# Patient Record
Sex: MALE | Race: WHITE | NOT HISPANIC OR LATINO | URBAN - METROPOLITAN AREA
[De-identification: names, ages, dates, MRNs, and addresses within clinical notes are randomized per-mention and may not be internally consistent; named-entity substitution may affect disease eponyms.]

---

## 2018-11-29 ENCOUNTER — EMERGENCY (EMERGENCY)
Facility: HOSPITAL | Age: 63
LOS: 1 days | Discharge: ANOTHER HOSPITAL | End: 2018-11-29
Attending: EMERGENCY MEDICINE | Admitting: EMERGENCY MEDICINE
Payer: COMMERCIAL

## 2018-11-29 ENCOUNTER — INPATIENT (INPATIENT)
Facility: HOSPITAL | Age: 63
LOS: 7 days | Discharge: HOME HEALTH | DRG: 234 | End: 2018-12-07
Attending: THORACIC SURGERY (CARDIOTHORACIC VASCULAR SURGERY) | Admitting: INTERNAL MEDICINE
Payer: COMMERCIAL

## 2018-11-29 VITALS
HEART RATE: 62 BPM | WEIGHT: 199.96 LBS | OXYGEN SATURATION: 99 % | RESPIRATION RATE: 16 BRPM | SYSTOLIC BLOOD PRESSURE: 160 MMHG | TEMPERATURE: 98 F | HEIGHT: 69 IN | DIASTOLIC BLOOD PRESSURE: 77 MMHG

## 2018-11-29 VITALS
HEART RATE: 65 BPM | OXYGEN SATURATION: 100 % | HEIGHT: 69 IN | RESPIRATION RATE: 17 BRPM | DIASTOLIC BLOOD PRESSURE: 94 MMHG | TEMPERATURE: 98 F | SYSTOLIC BLOOD PRESSURE: 180 MMHG | WEIGHT: 199.96 LBS

## 2018-11-29 VITALS
OXYGEN SATURATION: 98 % | SYSTOLIC BLOOD PRESSURE: 151 MMHG | TEMPERATURE: 98 F | RESPIRATION RATE: 16 BRPM | HEART RATE: 58 BPM | DIASTOLIC BLOOD PRESSURE: 69 MMHG

## 2018-11-29 DIAGNOSIS — I21.4 NON-ST ELEVATION (NSTEMI) MYOCARDIAL INFARCTION: ICD-10-CM

## 2018-11-29 DIAGNOSIS — R07.89 OTHER CHEST PAIN: ICD-10-CM

## 2018-11-29 DIAGNOSIS — Z95.5 PRESENCE OF CORONARY ANGIOPLASTY IMPLANT AND GRAFT: Chronic | ICD-10-CM

## 2018-11-29 DIAGNOSIS — I10 ESSENTIAL (PRIMARY) HYPERTENSION: ICD-10-CM

## 2018-11-29 DIAGNOSIS — I25.119 ATHEROSCLEROTIC HEART DISEASE OF NATIVE CORONARY ARTERY WITH UNSPECIFIED ANGINA PECTORIS: ICD-10-CM

## 2018-11-29 PROBLEM — Z00.00 ENCOUNTER FOR PREVENTIVE HEALTH EXAMINATION: Status: ACTIVE | Noted: 2018-11-29

## 2018-11-29 LAB
ALBUMIN SERPL ELPH-MCNC: 3.7 G/DL — SIGNIFICANT CHANGE UP (ref 3.3–5)
ALP SERPL-CCNC: 166 U/L — HIGH (ref 40–120)
ALT FLD-CCNC: 61 U/L — SIGNIFICANT CHANGE UP (ref 12–78)
ANION GAP SERPL CALC-SCNC: 9 MMOL/L — SIGNIFICANT CHANGE UP (ref 5–17)
APPEARANCE UR: CLEAR — SIGNIFICANT CHANGE UP
APTT BLD: 33 SEC — SIGNIFICANT CHANGE UP (ref 27.5–36.3)
APTT BLD: 48.6 SEC — HIGH (ref 27.5–36.3)
AST SERPL-CCNC: 28 U/L — SIGNIFICANT CHANGE UP (ref 15–37)
BASOPHILS # BLD AUTO: 0.03 K/UL — SIGNIFICANT CHANGE UP (ref 0–0.2)
BASOPHILS NFR BLD AUTO: 0.4 % — SIGNIFICANT CHANGE UP (ref 0–2)
BILIRUB SERPL-MCNC: 1.3 MG/DL — HIGH (ref 0.2–1.2)
BILIRUB UR-MCNC: NEGATIVE — SIGNIFICANT CHANGE UP
BLD GP AB SCN SERPL QL: NEGATIVE — SIGNIFICANT CHANGE UP
BUN SERPL-MCNC: 24 MG/DL — HIGH (ref 7–23)
CALCIUM SERPL-MCNC: 8.6 MG/DL — SIGNIFICANT CHANGE UP (ref 8.5–10.1)
CHLORIDE SERPL-SCNC: 108 MMOL/L — SIGNIFICANT CHANGE UP (ref 96–108)
CHOLEST SERPL-MCNC: 170 MG/DL — SIGNIFICANT CHANGE UP (ref 10–199)
CK MB BLD-MCNC: 1.7 % — SIGNIFICANT CHANGE UP (ref 0–3.5)
CK MB CFR SERPL CALC: 1.9 NG/ML — SIGNIFICANT CHANGE UP (ref 0–3.6)
CK SERPL-CCNC: 115 U/L — SIGNIFICANT CHANGE UP (ref 26–308)
CO2 SERPL-SCNC: 25 MMOL/L — SIGNIFICANT CHANGE UP (ref 22–31)
COLOR SPEC: SIGNIFICANT CHANGE UP
CREAT SERPL-MCNC: 1 MG/DL — SIGNIFICANT CHANGE UP (ref 0.5–1.3)
DIFF PNL FLD: NEGATIVE — SIGNIFICANT CHANGE UP
EOSINOPHIL # BLD AUTO: 0.17 K/UL — SIGNIFICANT CHANGE UP (ref 0–0.5)
EOSINOPHIL NFR BLD AUTO: 2 % — SIGNIFICANT CHANGE UP (ref 0–6)
GLUCOSE SERPL-MCNC: 114 MG/DL — HIGH (ref 70–99)
GLUCOSE UR QL: NEGATIVE — SIGNIFICANT CHANGE UP
HBA1C BLD-MCNC: 5.7 % — HIGH (ref 4–5.6)
HCT VFR BLD CALC: 42.7 % — SIGNIFICANT CHANGE UP (ref 39–50)
HGB BLD-MCNC: 14.4 G/DL — SIGNIFICANT CHANGE UP (ref 13–17)
IMM GRANULOCYTES NFR BLD AUTO: 0.2 % — SIGNIFICANT CHANGE UP (ref 0–1.5)
INR BLD: 1.08 RATIO — SIGNIFICANT CHANGE UP (ref 0.88–1.16)
KETONES UR-MCNC: SIGNIFICANT CHANGE UP
LEUKOCYTE ESTERASE UR-ACNC: NEGATIVE — SIGNIFICANT CHANGE UP
LYMPHOCYTES # BLD AUTO: 1.6 K/UL — SIGNIFICANT CHANGE UP (ref 1–3.3)
LYMPHOCYTES # BLD AUTO: 19 % — SIGNIFICANT CHANGE UP (ref 13–44)
MCHC RBC-ENTMCNC: 30.6 PG — SIGNIFICANT CHANGE UP (ref 27–34)
MCHC RBC-ENTMCNC: 33.7 GM/DL — SIGNIFICANT CHANGE UP (ref 32–36)
MCV RBC AUTO: 90.9 FL — SIGNIFICANT CHANGE UP (ref 80–100)
MONOCYTES # BLD AUTO: 0.64 K/UL — SIGNIFICANT CHANGE UP (ref 0–0.9)
MONOCYTES NFR BLD AUTO: 7.6 % — SIGNIFICANT CHANGE UP (ref 2–14)
NEUTROPHILS # BLD AUTO: 5.96 K/UL — SIGNIFICANT CHANGE UP (ref 1.8–7.4)
NEUTROPHILS NFR BLD AUTO: 70.8 % — SIGNIFICANT CHANGE UP (ref 43–77)
NITRITE UR-MCNC: NEGATIVE — SIGNIFICANT CHANGE UP
NRBC # BLD: 0 /100 WBCS — SIGNIFICANT CHANGE UP (ref 0–0)
NT-PROBNP SERPL-SCNC: 74 PG/ML — SIGNIFICANT CHANGE UP (ref 0–300)
PH UR: 7.5 — SIGNIFICANT CHANGE UP (ref 5–8)
PLATELET # BLD AUTO: 159 K/UL — SIGNIFICANT CHANGE UP (ref 150–400)
POTASSIUM SERPL-MCNC: 4.2 MMOL/L — SIGNIFICANT CHANGE UP (ref 3.5–5.3)
POTASSIUM SERPL-SCNC: 4.2 MMOL/L — SIGNIFICANT CHANGE UP (ref 3.5–5.3)
PROT SERPL-MCNC: 8.1 G/DL — SIGNIFICANT CHANGE UP (ref 6–8.3)
PROT UR-MCNC: NEGATIVE — SIGNIFICANT CHANGE UP
PROTHROM AB SERPL-ACNC: 12.3 SEC — SIGNIFICANT CHANGE UP (ref 10–12.9)
RBC # BLD: 4.7 M/UL — SIGNIFICANT CHANGE UP (ref 4.2–5.8)
RBC # FLD: 12.6 % — SIGNIFICANT CHANGE UP (ref 10.3–14.5)
RH IG SCN BLD-IMP: POSITIVE — SIGNIFICANT CHANGE UP
SODIUM SERPL-SCNC: 142 MMOL/L — SIGNIFICANT CHANGE UP (ref 135–145)
SP GR SPEC: 1.02 — SIGNIFICANT CHANGE UP (ref 1.01–1.02)
T3 SERPL-MCNC: 120 NG/DL — SIGNIFICANT CHANGE UP (ref 80–200)
T4 AB SER-ACNC: 6.6 UG/DL — SIGNIFICANT CHANGE UP (ref 4.6–12)
TROPONIN I SERPL-MCNC: 0.01 NG/ML — SIGNIFICANT CHANGE UP (ref 0.01–0.04)
TROPONIN I SERPL-MCNC: 0.18 NG/ML — HIGH (ref 0.01–0.04)
TSH SERPL-MCNC: 1.4 UIU/ML — SIGNIFICANT CHANGE UP (ref 0.27–4.2)
UROBILINOGEN FLD QL: NEGATIVE — SIGNIFICANT CHANGE UP
WBC # BLD: 8.42 K/UL — SIGNIFICANT CHANGE UP (ref 3.8–10.5)
WBC # FLD AUTO: 8.42 K/UL — SIGNIFICANT CHANGE UP (ref 3.8–10.5)

## 2018-11-29 PROCEDURE — 93005 ELECTROCARDIOGRAM TRACING: CPT

## 2018-11-29 PROCEDURE — 80053 COMPREHEN METABOLIC PANEL: CPT

## 2018-11-29 PROCEDURE — 71045 X-RAY EXAM CHEST 1 VIEW: CPT | Mod: 26

## 2018-11-29 PROCEDURE — 71045 X-RAY EXAM CHEST 1 VIEW: CPT | Mod: 26,77

## 2018-11-29 PROCEDURE — 71045 X-RAY EXAM CHEST 1 VIEW: CPT

## 2018-11-29 PROCEDURE — 82553 CREATINE MB FRACTION: CPT

## 2018-11-29 PROCEDURE — 82550 ASSAY OF CK (CPK): CPT

## 2018-11-29 PROCEDURE — 84484 ASSAY OF TROPONIN QUANT: CPT

## 2018-11-29 PROCEDURE — 85730 THROMBOPLASTIN TIME PARTIAL: CPT

## 2018-11-29 PROCEDURE — 99285 EMERGENCY DEPT VISIT HI MDM: CPT | Mod: 25

## 2018-11-29 PROCEDURE — 93306 TTE W/DOPPLER COMPLETE: CPT | Mod: 26

## 2018-11-29 PROCEDURE — 99152 MOD SED SAME PHYS/QHP 5/>YRS: CPT | Mod: GC

## 2018-11-29 PROCEDURE — 93458 L HRT ARTERY/VENTRICLE ANGIO: CPT | Mod: 26,GC

## 2018-11-29 PROCEDURE — 85027 COMPLETE CBC AUTOMATED: CPT

## 2018-11-29 PROCEDURE — 36415 COLL VENOUS BLD VENIPUNCTURE: CPT

## 2018-11-29 PROCEDURE — 93010 ELECTROCARDIOGRAM REPORT: CPT

## 2018-11-29 PROCEDURE — 99285 EMERGENCY DEPT VISIT HI MDM: CPT

## 2018-11-29 RX ORDER — METOPROLOL TARTRATE 50 MG
0 TABLET ORAL
Qty: 0 | Refills: 0 | COMMUNITY

## 2018-11-29 RX ORDER — ATORVASTATIN CALCIUM 80 MG/1
80 TABLET, FILM COATED ORAL AT BEDTIME
Qty: 0 | Refills: 0 | Status: DISCONTINUED | OUTPATIENT
Start: 2018-11-29 | End: 2018-12-03

## 2018-11-29 RX ORDER — ASPIRIN/CALCIUM CARB/MAGNESIUM 324 MG
81 TABLET ORAL DAILY
Qty: 0 | Refills: 0 | Status: DISCONTINUED | OUTPATIENT
Start: 2018-11-29 | End: 2018-12-03

## 2018-11-29 RX ORDER — HEPARIN SODIUM 5000 [USP'U]/ML
INJECTION INTRAVENOUS; SUBCUTANEOUS
Qty: 25000 | Refills: 0 | Status: DISCONTINUED | OUTPATIENT
Start: 2018-11-29 | End: 2018-12-03

## 2018-11-29 RX ORDER — CLOPIDOGREL BISULFATE 75 MG/1
0 TABLET, FILM COATED ORAL
Qty: 0 | Refills: 0 | COMMUNITY

## 2018-11-29 RX ORDER — ATORVASTATIN CALCIUM 80 MG/1
0 TABLET, FILM COATED ORAL
Qty: 0 | Refills: 0 | COMMUNITY

## 2018-11-29 RX ORDER — METOPROLOL TARTRATE 50 MG
100 TABLET ORAL DAILY
Qty: 0 | Refills: 0 | Status: DISCONTINUED | OUTPATIENT
Start: 2018-11-29 | End: 2018-12-03

## 2018-11-29 RX ORDER — ASPIRIN/CALCIUM CARB/MAGNESIUM 324 MG
325 TABLET ORAL ONCE
Qty: 0 | Refills: 0 | Status: COMPLETED | OUTPATIENT
Start: 2018-11-29 | End: 2018-11-29

## 2018-11-29 RX ORDER — TICAGRELOR 90 MG/1
180 TABLET ORAL ONCE
Qty: 0 | Refills: 0 | Status: COMPLETED | OUTPATIENT
Start: 2018-11-29 | End: 2018-11-29

## 2018-11-29 RX ORDER — VALSARTAN 80 MG/1
0 TABLET ORAL
Qty: 0 | Refills: 0 | COMMUNITY

## 2018-11-29 RX ORDER — METOPROLOL TARTRATE 50 MG
25 TABLET ORAL ONCE
Qty: 0 | Refills: 0 | Status: COMPLETED | OUTPATIENT
Start: 2018-11-29 | End: 2018-11-29

## 2018-11-29 RX ORDER — HEPARIN SODIUM 5000 [USP'U]/ML
5600 INJECTION INTRAVENOUS; SUBCUTANEOUS EVERY 6 HOURS
Qty: 0 | Refills: 0 | Status: DISCONTINUED | OUTPATIENT
Start: 2018-11-29 | End: 2018-12-03

## 2018-11-29 RX ORDER — AMLODIPINE BESYLATE 2.5 MG/1
5 TABLET ORAL DAILY
Qty: 0 | Refills: 0 | Status: DISCONTINUED | OUTPATIENT
Start: 2018-11-29 | End: 2018-12-03

## 2018-11-29 RX ADMIN — ATORVASTATIN CALCIUM 80 MILLIGRAM(S): 80 TABLET, FILM COATED ORAL at 22:14

## 2018-11-29 RX ADMIN — Medication 325 MILLIGRAM(S): at 07:55

## 2018-11-29 RX ADMIN — AMLODIPINE BESYLATE 5 MILLIGRAM(S): 2.5 TABLET ORAL at 17:42

## 2018-11-29 RX ADMIN — HEPARIN SODIUM 1000 UNIT(S)/HR: 5000 INJECTION INTRAVENOUS; SUBCUTANEOUS at 23:04

## 2018-11-29 RX ADMIN — Medication 25 MILLIGRAM(S): at 13:22

## 2018-11-29 RX ADMIN — TICAGRELOR 180 MILLIGRAM(S): 90 TABLET ORAL at 13:22

## 2018-11-29 NOTE — CONSULT NOTE ADULT - PROBLEM SELECTOR RECOMMENDATION 3
patient s/p 3 stents about 10 years ago. Continue Aspirin, Plavix, statin and ARB. Patient should come for nuclear stress test tomorrow in our office to evaluate for ischemia.

## 2018-11-29 NOTE — ED PROVIDER NOTE - PROGRESS NOTE DETAILS
All results were explained to patient and/or family and a copy of all available results given.  As per Saundra, transfer to [place]

## 2018-11-29 NOTE — ED PROVIDER NOTE - CARE PLAN
Principal Discharge DX:	Chest pain Principal Discharge DX:	NSTEMI (non-ST elevated myocardial infarction)

## 2018-11-29 NOTE — H&P CARDIOLOGY - PMH
CAD (coronary artery disease)    Gout    Hypercholesteremia    Hypertension    NSTEMI (non-ST elevated myocardial infarction)

## 2018-11-29 NOTE — ED PROVIDER NOTE - OBJECTIVE STATEMENT
chest tightness since 6am today chest tightness since 6am today, lasted about 30 minutes, resolved in the ED.  similar to prior chest pain resulting in cardiac stents x 3.

## 2018-11-29 NOTE — ED ADULT NURSE NOTE - OBJECTIVE STATEMENT
Patient BIBEMS for Patient BIBEMS for chest tightness and pressure that began at 6am today. Patient has hx of stents 2/2 occlusions. Patient denies chest pain/pressure/tightness at this time. VSS. Monitoring patient closely on tele.

## 2018-11-29 NOTE — CONSULT NOTE ADULT - PROBLEM SELECTOR RECOMMENDATION 9
Patient with history of CAD. First troponin negative and ECG shows no ischemic changes. I would recommend another troponin at noon. If negative patient can be discharged home. Our office was already called and patient going to be set up for a nuclear stress test tomorrow. I explained to patient and wife that if chest pressure returns when he goes home to call 911 immediately and return to hospital

## 2018-11-29 NOTE — H&P CARDIOLOGY - HISTORY OF PRESENT ILLNESS
This is a 63 year old  male with history of hypertension, HLD, gout  and CAD s/p 3 stents about 10 years prior ( on Aspirin and Plavix);  significant FH for premature CAD, father  at 61 from MI.   Presents to [facility] ED this am with c/c of chest pain. The pain was described as a mild-moderate pressure like sensation in the center of chest and non-radiating, constant. Patient states he woke up around 6 am with the pain. Pain was non-radiating. Not associated with palpitations or SOB. Patient states he called 911 and EMS brought him to hospital. Patient states he was not given SL nitro. Pain subsided in about 20-30 minutes w/o intervention.  Patient states he is compliant with his medications. He played soccer last week with no chest pain, SOB, or palpitations but has experienced CP a few times before, cardiologist Dr Levin, pt reports last NST was normal approx 2 years ago. in ED First troponin negative but 2nd elevated at 0.181; ECG shows no ischemic changes.  Transferred to [facility] for cardiac cath with possible intervention.  Presently asymptomatic in no chest pain. This is a 63 year old  male with history of hypertension, HLD, gout  and CAD s/p 3 stents about 10 years prior ( on Aspirin and Plavix);  significant FH for premature CAD, father  at 61 from MI.   Presents to [facility] ED this am with c/c of chest pain. The pain was described as a mild-moderate pressure like sensation in the center of chest and non-radiating, constant. Patient states he woke up around 6 am with the pain. Pain was non-radiating. Not associated with palpitations or SOB. Patient states he called 911 and EMS brought him to hospital. Patient states he was not given SL nitro. Pain subsided in about 20-30 minutes after taking " 2 baby aspirins".  Patient states he is compliant with his medications. He played soccer last week with no chest pain, SOB, or palpitations but has experienced CP a few times before, cardiologist Dr Levin, pt reports last NST was normal approx 2 years ago. in ED First troponin negative but 2nd elevated at 0.181; ECG shows no ischemic changes.  Transferred to [facility] for cardiac cath with possible intervention.  Presently asymptomatic in no chest pain.

## 2018-11-29 NOTE — ED ADULT NURSE NOTE - CHPI ED NUR SYMPTOMS NEG
no congestion/no dizziness/no shortness of breath/no vomiting/no diaphoresis/no fever/no nausea/no back pain/no chills/no syncope

## 2018-11-29 NOTE — CONSULT NOTE ADULT - SUBJECTIVE AND OBJECTIVE BOX
REQUESTING PHYSICIAN: Dr. Buckley    REASON FOR CONSULT: Patient is a 63y old  Male who presents with a chief complaint of chest pain    CHIEF COMPLAINT: Patient is a 63y old  Male who presents with a chief complaint of chest pain.    HPI: 63 year old male with history of hypertension and CAD s/p 3 stents about 10 years prior presents to the ED with chest pain. The pain was described as a mild-moderate pressure like sensation in the center of chest and non-radiating. Patient states he woke up around 6 am with the pain. Pain was non-radiating. Not associated with palpitations or SOB. Patient states he called 911 and EMS brought him to hospital. Patient states he was not given SL nitro. Pain subsided in about 20-30 minutes. Patient states he is compliant with his medications. He played soccer last week with no chest pain, SOB, or palpitations.       PAST MEDICAL & SURGICAL HISTORY:  Gout  Hypercholesteremia  Hypertension  Coronary stent patent  Coronary Artery Disease        FAMILY HISTORY: non-contributory      MEDICATIONS  (STANDING):    MEDICATIONS  (PRN):      Allergies    No Known Allergies    Intolerances        REVIEW OF SYSTEMS:    CONSTITUTIONAL: No weakness, fevers or chills  EYES/ENT: No visual changes;  No vertigo or throat pain   NECK: No pain or stiffness  RESPIRATORY: No cough, wheezing, hemoptysis; No shortness of breath  CARDIOVASCULAR: (+) chest pain, No palpitations  GASTROINTESTINAL: No abdominal pain. No nausea, vomiting, or hematemesis; No diarrhea or constipation. No melena or hematochezia.  GENITOURINARY: No dysuria, frequency or hematuria  NEUROLOGICAL: No numbness or weakness  SKIN: No itching or rash  All other review of systems is negative unless indicated above    VITAL SIGNS:   Vital Signs Last 24 Hrs  T(C): 37 (2018 07:59), Max: 37 (2018 07:59)  T(F): 98.6 (2018 07:59), Max: 98.6 (2018 07:59)  HR: 66 (2018 07:59) (65 - 66)  BP: 167/77 (2018 07:59) (167/77 - 180/94)  BP(mean): --  RR: 18 (2018 07:59) (17 - 18)  SpO2: 94% (2018 07:59) (94% - 100%)    I&O's Summary      PHYSICAL EXAM:    Constitutional: NAD, awake and alert, well-developed  Eyes:  EOMI,  Pupils round, No oral cyanosis.  Pulmonary: Non-labored, breath sounds are clear bilaterally, No wheezing, rales or rhonchi  Cardiovascular: S1 and S2, regular rate and rhythm, no Murmurs, gallops or rubs  Gastrointestinal: Bowel Sounds present, soft, nontender.   Lymph: No peripheral edema. No cervical lymphadenopathy.  Neurological: Alert, no focal deficits  Extremities: no lower extremity edema bilaterally. intact distal pedal pulses bilaterally.  Skin: No rashes.  Psych:  Mood & affect appropriate    LABS: All Labs Reviewed:                        14.4   8.42  )-----------( 159      ( 2018 07:54 )             42.7     2018 07:54    142    |  108    |  24     ----------------------------<  114    4.2     |  25     |  1.00     Ca    8.6        2018 07:54    TPro  8.1    /  Alb  3.7    /  TBili  1.3    /  DBili  x      /  AST  28     /  ALT  61     /  AlkPhos  166    2018 07:54    PTT - ( 2018 07:54 )  PTT:33.0 sec  CARDIAC MARKERS ( 2018 07:54 )  .015 ng/mL / x     / 115 U/L / x     / 1.9 ng/mL      Blood Culture:         EK2018, NSR with possible LAE    Imaging:  < from: Xray Chest 1 View AP/PA (18 @ 07:46) >    EXAM:  XR CHEST AP OR PA 1V                            PROCEDURE DATE:  2018          INTERPRETATION:  Chest pain.    AP chest.    Heart not grossly enlarged. Mild nonspecific accentuated bibasilar   bronchovascular markings likely chronic. No focal consolidation or   pleural effusion.    Impression: No acute pleural-parenchymal process.                ANN-MARIE MCKEON M.D., ATTENDING RADIOLOGIST  This document has been electronically signed. 2018  8:49AM    < end of copied text > REQUESTING PHYSICIAN: Dr. Buckley    REASON FOR CONSULT: Patient is a 63y old  Male who presents with a chief complaint of chest pain    CHIEF COMPLAINT: Patient is a 63y old  Male who presents with a chief complaint of chest pain.    HPI: 63 year old male with history of hypertension and CAD s/p 3 stents about 10 years prior presents to the ED with chest pain. The pain was described as a mild-moderate pressure like sensation in the center of chest and non-radiating. Patient states he woke up around 6 am with the pain. Pain was non-radiating. Not associated with palpitations or SOB. Patient states he called 911 and EMS brought him to hospital. Patient states he was not given SL nitro. Pain subsided in about 20-30 minutes. Patient states he is compliant with his medications. He played soccer last week with no chest pain, SOB, or palpitations.       PAST MEDICAL & SURGICAL HISTORY:  Gout  Hypercholesteremia  Hypertension  Coronary stent patent  Coronary Artery Disease    SOCIAL HISTORY:  no smoking, social EtOH, no illicit drug use, lives at home with wife.      FAMILY HISTORY: non-contributory      MEDICATIONS  (STANDING):    MEDICATIONS  (PRN):      Allergies    No Known Allergies    Intolerances        REVIEW OF SYSTEMS:    CONSTITUTIONAL: No weakness, fevers or chills  EYES/ENT: No visual changes;  No vertigo or throat pain   NECK: No pain or stiffness  RESPIRATORY: No cough, wheezing, hemoptysis; No shortness of breath  CARDIOVASCULAR: (+) chest pain, No palpitations  GASTROINTESTINAL: No abdominal pain. No nausea, vomiting, or hematemesis; No diarrhea or constipation. No melena or hematochezia.  GENITOURINARY: No dysuria, frequency or hematuria  NEUROLOGICAL: No numbness or weakness  SKIN: No itching or rash  All other review of systems is negative unless indicated above    VITAL SIGNS:   Vital Signs Last 24 Hrs  T(C): 37 (2018 07:59), Max: 37 (2018 07:59)  T(F): 98.6 (2018 07:59), Max: 98.6 (2018 07:59)  HR: 66 (2018 07:59) (65 - 66)  BP: 167/77 (2018 07:59) (167/77 - 180/94)  BP(mean): --  RR: 18 (2018 07:59) (17 - 18)  SpO2: 94% (2018 07:59) (94% - 100%)    I&O's Summary      PHYSICAL EXAM:    Constitutional: NAD, awake and alert, well-developed  Eyes:  EOMI,  Pupils round, No oral cyanosis.  Pulmonary: Non-labored, breath sounds are clear bilaterally, No wheezing, rales or rhonchi  Cardiovascular: S1 and S2, regular rate and rhythm, no Murmurs, gallops or rubs  Gastrointestinal: Bowel Sounds present, soft, nontender.   Lymph: No peripheral edema. No cervical lymphadenopathy.  Neurological: Alert, no focal deficits  Extremities: no lower extremity edema bilaterally. intact distal pedal pulses bilaterally.  Skin: No rashes.  Psych:  Mood & affect appropriate    LABS: All Labs Reviewed:                        14.4   8.42  )-----------( 159      ( 2018 07:54 )             42.7     2018 07:54    142    |  108    |  24     ----------------------------<  114    4.2     |  25     |  1.00     Ca    8.6        2018 07:54    TPro  8.1    /  Alb  3.7    /  TBili  1.3    /  DBili  x      /  AST  28     /  ALT  61     /  AlkPhos  166    2018 07:54    PTT - ( 2018 07:54 )  PTT:33.0 sec  CARDIAC MARKERS ( 2018 07:54 )  .015 ng/mL / x     / 115 U/L / x     / 1.9 ng/mL      Blood Culture:         EK2018, NSR with possible LAE    Imaging:  < from: Xray Chest 1 View AP/PA (18 @ 07:46) >    EXAM:  XR CHEST AP OR PA 1V                            PROCEDURE DATE:  2018          INTERPRETATION:  Chest pain.    AP chest.    Heart not grossly enlarged. Mild nonspecific accentuated bibasilar   bronchovascular markings likely chronic. No focal consolidation or   pleural effusion.    Impression: No acute pleural-parenchymal process.                ANN-MARIE MCKEON M.D., ATTENDING RADIOLOGIST  This document has been electronically signed. 2018  8:49AM    < end of copied text >

## 2018-11-30 DIAGNOSIS — E78.00 PURE HYPERCHOLESTEROLEMIA, UNSPECIFIED: ICD-10-CM

## 2018-11-30 DIAGNOSIS — I25.10 ATHEROSCLEROTIC HEART DISEASE OF NATIVE CORONARY ARTERY WITHOUT ANGINA PECTORIS: ICD-10-CM

## 2018-11-30 DIAGNOSIS — I10 ESSENTIAL (PRIMARY) HYPERTENSION: ICD-10-CM

## 2018-11-30 PROBLEM — M10.9 GOUT, UNSPECIFIED: Chronic | Status: ACTIVE | Noted: 2018-11-29

## 2018-11-30 LAB
APTT BLD: 47.5 SEC — HIGH (ref 27.5–36.3)
APTT BLD: 54.9 SEC — HIGH (ref 27.5–36.3)
APTT BLD: 60.3 SEC — HIGH (ref 27.5–36.3)
HCT VFR BLD CALC: 42.5 % — SIGNIFICANT CHANGE UP (ref 39–50)
HGB BLD-MCNC: 14.5 G/DL — SIGNIFICANT CHANGE UP (ref 13–17)
MCHC RBC-ENTMCNC: 30.6 PG — SIGNIFICANT CHANGE UP (ref 27–34)
MCHC RBC-ENTMCNC: 34.2 GM/DL — SIGNIFICANT CHANGE UP (ref 32–36)
MCV RBC AUTO: 89.3 FL — SIGNIFICANT CHANGE UP (ref 80–100)
MRSA PCR RESULT.: SIGNIFICANT CHANGE UP
PA ADP PRP-ACNC: 54 PRU — LOW (ref 194–417)
PLATELET # BLD AUTO: 172 K/UL — SIGNIFICANT CHANGE UP (ref 150–400)
RBC # BLD: 4.76 M/UL — SIGNIFICANT CHANGE UP (ref 4.2–5.8)
RBC # FLD: 12.1 % — SIGNIFICANT CHANGE UP (ref 10.3–14.5)
S AUREUS DNA NOSE QL NAA+PROBE: SIGNIFICANT CHANGE UP
WBC # BLD: 7.5 K/UL — SIGNIFICANT CHANGE UP (ref 3.8–10.5)
WBC # FLD AUTO: 7.5 K/UL — SIGNIFICANT CHANGE UP (ref 3.8–10.5)

## 2018-11-30 PROCEDURE — 99222 1ST HOSP IP/OBS MODERATE 55: CPT

## 2018-11-30 PROCEDURE — 71250 CT THORAX DX C-: CPT | Mod: 26

## 2018-11-30 PROCEDURE — 94010 BREATHING CAPACITY TEST: CPT | Mod: 26

## 2018-11-30 PROCEDURE — 93010 ELECTROCARDIOGRAM REPORT: CPT

## 2018-11-30 PROCEDURE — 93880 EXTRACRANIAL BILAT STUDY: CPT | Mod: 26

## 2018-11-30 RX ADMIN — HEPARIN SODIUM 1200 UNIT(S)/HR: 5000 INJECTION INTRAVENOUS; SUBCUTANEOUS at 13:23

## 2018-11-30 RX ADMIN — HEPARIN SODIUM 1200 UNIT(S)/HR: 5000 INJECTION INTRAVENOUS; SUBCUTANEOUS at 20:11

## 2018-11-30 RX ADMIN — AMLODIPINE BESYLATE 5 MILLIGRAM(S): 2.5 TABLET ORAL at 05:44

## 2018-11-30 RX ADMIN — HEPARIN SODIUM 1200 UNIT(S)/HR: 5000 INJECTION INTRAVENOUS; SUBCUTANEOUS at 06:35

## 2018-11-30 RX ADMIN — Medication 100 MILLIGRAM(S): at 05:44

## 2018-11-30 RX ADMIN — Medication 81 MILLIGRAM(S): at 08:02

## 2018-11-30 RX ADMIN — ATORVASTATIN CALCIUM 80 MILLIGRAM(S): 80 TABLET, FILM COATED ORAL at 21:44

## 2018-11-30 NOTE — CONSULT NOTE ADULT - ASSESSMENT
This is a 63 year old  male with history of hypertension, HLD, gout  and CAD s/p 3 stents about 10 years prior ( on Aspirin and Plavix), pt s/p cardiac cath 11/30 and found to have multivessel disease. Now for CABG eval/work up w/ Dr. Fishman.

## 2018-11-30 NOTE — PROGRESS NOTE ADULT - ASSESSMENT
HPI:  This is a 63 year old  male with history of hypertension, HLD, gout  and CAD s/p 3 stents about 10 years prior ( on Aspirin and Plavix);  significant FH for premature CAD, father  at 61 from MI.   Presents to [facility] ED this am with c/c of chest pain. The pain was described as a mild-moderate pressure like sensation in the center of chest and non-radiating, constant. Patient states he woke up around 6 am with the pain. Pain was non-radiating. Not associated with palpitations or SOB. Patient states he called 911 and EMS brought him to hospital. Patient states he was not given SL nitro. Pain subsided in about 20-30 minutes after taking " 2 baby aspirins".  Patient states he is compliant with his medications. He played soccer last week with no chest pain, SOB, or palpitations but has experienced CP a few times before, cardiologist Dr Levin, pt reports last NST was normal approx 2 years ago. in ED First troponin negative but 2nd elevated at 0.181; ECG shows no ischemic changes.  Transferred to [facility] for cardiac cath with possible intervention.  Presently asymptomatic in no chest pain. (2018 16:12)  s/p cath 3 vessel disease  [facility] consult w Dr Fishman

## 2018-11-30 NOTE — CONSULT NOTE ADULT - ATTENDING COMMENTS
The patient is a [age]-year-old gentleman with unstable angina and a history of coronary artery disease. He has severe triple vessel coronary artery disease. He's an appropriate candidate for a CABG. Targets include the LAD, OM, ramus and PDA. His STS risk of mortality is approximately 2%. Risks and benefits were discussed with the patient and his wife and they agreed to proceed.

## 2018-11-30 NOTE — PROGRESS NOTE ADULT - SUBJECTIVE AND OBJECTIVE BOX
63 yr old male admitted w CP   s/p cath w 3 vessel disease  pain free overnight. No SOB    MEDICATIONS  (STANDING):  amLODIPine   Tablet 5 milliGRAM(s) Oral daily  aspirin enteric coated 81 milliGRAM(s) Oral daily  atorvastatin 80 milliGRAM(s) Oral at bedtime  heparin  Infusion.  Unit(s)/Hr (10 mL/Hr) IV Continuous <Continuous>  metoprolol succinate  milliGRAM(s) Oral daily    MEDICATIONS  (PRN):  heparin  Injectable 5600 Unit(s) IV Push every 6 hours PRN For aPTT less than 40    Vital Signs Last 24 Hrs  T(C): 36.2 (30 Nov 2018 05:38), Max: 36.8 (29 Nov 2018 13:23)  T(F): 97.1 (30 Nov 2018 05:38), Max: 98.2 (29 Nov 2018 13:23)  HR: 71 (30 Nov 2018 05:38) (58 - 72)  BP: 139/77 (30 Nov 2018 05:38) (130/76 - 163/79)  BP(mean): 104 (29 Nov 2018 16:12) (104 - 104)  RR: 17 (30 Nov 2018 05:38) (15 - 18)  SpO2: 96% (30 Nov 2018 05:38) (94% - 100%)    PE  A+O x 4  Pulm CTA hardeep  CV S1 S2 RRR  abd soft non tender   RRA site benign- no hematoma no bleeding  cap refill <2 sec radial pulse intact    S/p cardiac cath revealing LAD 80%, Cx 80%, RCA 99% (prelim report)  For [facility] consult w Dr Fishman  Cont Heparin gtt  continue ASA, statin   Pre CABG work up in progress

## 2018-11-30 NOTE — CONSULT NOTE ADULT - SUBJECTIVE AND OBJECTIVE BOX
History of Present Illness:    This is a 63 year old  male with history of hypertension, HLD, gout  and CAD s/p 3 stents about 10 years prior ( on Aspirin and Plavix);  significant FH for premature CAD, father  at 61 from MI.   Presents to [facility] ED with c/c of chest pain. The pain was described as a mild-moderate pressure like sensation in the center of chest and non-radiating, constant. Patient states he woke up around 6 am with the pain. Pain was non-radiating. Not associated with palpitations or SOB. Patient states he called 911 and EMS brought him to hospital. Patient states he was not given SL nitro. Pain subsided in about 20-30 minutes after taking " 2 baby aspirins".  Patient states he is compliant with his medications. He played soccer last week with no chest pain, SOB, or palpitations but has experienced CP a few times before, cardiologist Dr Levin, pt reports last NST was normal approx 2 years ago. in ED First troponin negative but 2nd elevated at 0.181; ECG shows no ischemic changes.  Transferred to [facility] for cardiac cath with possible intervention.  Presently asymptomatic in no chest pain.     Pt s/p cardiac cath which shows multivessel disease.    CT Surgery consulted for CABG oanh w/ Dr. Fishman    Past Medical History  NSTEMI (non-ST elevated myocardial infarction)  CAD (coronary artery disease)  Gout  Hypercholesteremia  Hypertension      Past Surgical History  Coronary stent patent      MEDICATIONS  (STANDING):  amLODIPine   Tablet 5 milliGRAM(s) Oral daily  aspirin enteric coated 81 milliGRAM(s) Oral daily  atorvastatin 80 milliGRAM(s) Oral at bedtime  heparin  Infusion.  Unit(s)/Hr (10 mL/Hr) IV Continuous <Continuous>  metoprolol succinate  milliGRAM(s) Oral daily    MEDICATIONS  (PRN):  heparin  Injectable 5600 Unit(s) IV Push every 6 hours PRN For aPTT less than 40      Vital Signs Last 24 Hrs  T(C): 36.8 (18 @ 14:58), Max: 36.8 (18 @ 14:58)  T(F): 98.2 (18 @ 14:58), Max: 98.2 (18 @ 14:58)  HR: 68 (18 @ 14:58) (62 - 72)  BP: 144/83 (18 @ 14:58) (130/76 - 163/79)  RR: 18 (18 @ 14:58) (15 - 18)  SpO2: 98% (18 @ 14:58) (94% - 99%)           Daily Height in cm: 175.26 (2018 16:12)    Daily Weight in k.7 (2018 16:12)  Admit Wt: Drug Dosing Weight  Height (cm): 175.26 (2018 16:12)  Weight (kg): 90.7 (2018 16:12)  BMI (kg/m2): 29.5 (2018 16:12)  BSA (m2): 2.07 (2018 16:12)    Allergies: No Known Allergies      SOCIAL HISTORY:  Smoker: [ ] Yes  [ x ] No          ETOH use: [x ] Yes  [ ] No              FREQUENCY / QUANTITY: occasional  Ilicit Drug use:  [ ] Yes  [ x] No      Relevant Family History  FAMILY HISTORY:  Family history of acute myocardial infarction (Father): at age 61      Review of Systems  GENERAL:  no weakness, fatigue, fevers or chills  NEURO: no dizziness, numbness, tingling or weakness  SKIN: no itching, burning, rashes, or lesions   HEENT: no visual changes;  no headache, no vertigo, no recent colds  RESPIRATORY: no shortness of breath, no cough, sputum, wheezing, hemoptysis   CARDIOVASCULAR:  no chest pain,  or palpitations  GI: no abd pain. no N/V/D.  PERIPHERAL VASCULAR: no swelling, no tenderness, no erythema, no varicose veins.     PHYSICAL EXAM  General: Well nourished, well developed, NAD.                                              Neuro: Normal exam oriented to person/place & time with no focal motor or sensory  deficits.                    Eyes: Normal exam of conjunctiva & lids, pupils equally reactive.   ENT: Normal exam of nasal/oral mucosa with absence of cyanosis  Neck: Normal exam of jugular veins, trachea & thyroid.   Chest: Normal lung exam with good air movement absence of wheezes, rales, or rhonchi                                                                        CV:  Auscultation: normal S1S2, RRR, no murmurs  Carotids: No Bruits Abdominal Aorta: normal                                                                     GI: Normal exam of abdomen with no noted masses or tenderness. +BSx4Q                                                                                            Extremities: Normal no evidence of cyanosis or deformity, Edema:   Lower Extremity Pulses: positive bl pedal pulses  SKIN : Normal exam to inspection & palpation                                                           LABS:                        14.5   7.5   )-----------( 172      ( 2018 05:32 )             42.5         142  |  108  |  24<H>  ----------------------------<  114<H>  4.2   |  25  |  1.00    Ca    8.6      2018 07:54    TPro  8.1  /  Alb  3.7  /  TBili  1.3<H>  /  DBili  x   /  AST  28  /  ALT  61  /  AlkPhos  166<H>      PT/INR - ( 2018 18:34 )   PT: 12.3 sec;   INR: 1.08 ratio         PTT - ( 2018 12:51 )  PTT:54.9 sec      Cardiac Cath:  prelim LAD - 80%, Cx - 80%, RCA - 99%    TTE:     Conclusions:  1. Normal mitral valve. Minimal mitral regurgitation.  2. Normal trileaflet aortic valve. No aortic valve  regurgitation seen.  3. Mildly dilated left atrium.  LA volume index = 38 cc/m2.  4. Proximal septal thickening, otherwise normal left  ventricular internal dimensions and wall thicknesses.  5. Endocardial visualization enhanced with intravenous  injection of Ultrasonic Enhancing Agent (Definity).  Mild  segmental left ventricular systolic dysfunction. The mid  inferolateral wall, and the basal  inferolateral wall are  hypokinetic.  6. Mild diastolic dysfunction (Stage I).  7. Normal right ventricular size and function.  *** No previous Echo exam.  ------------------------------------------------------------------------  Confirmed on  2018 - 20:32:28 by Carlos Mcnamara M.D.  ------------------------------------------------------------------------ History of Present Illness:    This is a 63 year old  male with history of hypertension, HLD, gout  and CAD s/p 3 stents about 10 years prior ( on Aspirin and Plavix);  significant FH for premature CAD, father  at 61 from MI.   Presents to [facility] ED with c/c of chest pain. The pain was described as a mild-moderate pressure like sensation in the center of chest and non-radiating, constant. Patient states he woke up around 6 am with the pain. Pain was non-radiating. Not associated with palpitations or SOB. Patient states he called 911 and EMS brought him to hospital. Patient states he was not given SL nitro. Pain subsided in about 20-30 minutes after taking " 2 baby aspirins".  Patient states he is compliant with his medications. He played soccer last week with no chest pain, SOB, or palpitations but has experienced CP a few times before, cardiologist Dr Levin, pt reports last NST was normal approx 2 years ago. in ED First troponin negative but 2nd elevated at 0.181; ECG shows no ischemic changes.  Transferred to [facility] for cardiac cath with possible intervention.  Presently asymptomatic in no chest pain.     Pt s/p cardiac cath which shows multivessel disease.    CT Surgery consulted for CABG oanh w/ Dr. 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  Unit(s)/Hr (10 mL/Hr) IV Continuous <Continuous>  metoprolol succinate  milliGRAM(s) Oral daily    MEDICATIONS  (PRN):  heparin  Injectable 5600 Unit(s) IV Push every 6 hours PRN For aPTT less than 40      Vital Signs Last 24 Hrs  T(C): 36.8 (18 @ 14:58), Max: 36.8 (18 @ 14:58)  T(F): 98.2 (18 @ 14:58), Max: 98.2 (18 @ 14:58)  HR: 68 (18 @ 14:58) (62 - 72)  BP: 144/83 (18 @ 14:58) (130/76 - 163/79)  RR: 18 (18 @ 14:58) (15 - 18)  SpO2: 98% (18 @ 14:58) (94% - 99%)           Daily Height in cm: 175.26 (2018 16:12)    Daily Weight in k.7 (2018 16:12)  Admit Wt: Drug Dosing Weight  Height (cm): 175.26 (2018 16:12)  Weight (kg): 90.7 (2018 16:12)  BMI (kg/m2): 29.5 (2018 16:12)  BSA (m2): 2.07 (2018 16:12)    Allergies: No Known Allergies      SOCIAL HISTORY:  Smoker: [ ] Yes  [ x ] No          ETOH use: [x ] Yes  [ ] No              FREQUENCY / QUANTITY: occasional  Ilicit Drug use:  [ ] Yes  [ x] No      Relevant Family History  FAMILY HISTORY:  Family history of acute myocardial infarction (Father): at age 61      Review of Systems  GENERAL:  no weakness, fatigue, fevers or chills  NEURO: no dizziness, numbness, tingling or weakness  SKIN: no itching, burning, rashes, or lesions   HEENT: no visual changes;  no headache, no vertigo, no recent colds  RESPIRATORY: no shortness of breath, no cough, sputum, wheezing, hemoptysis   CARDIOVASCULAR:  no chest pain,  or palpitations  GI: no abd pain. no N/V/D.  PERIPHERAL VASCULAR: no swelling, no tenderness, no erythema, no varicose veins.     PHYSICAL EXAM  General: Well nourished, well developed, NAD.                                              Neuro: Normal exam oriented to person/place & time with no focal motor or sensory  deficits.                    Eyes: Normal exam of conjunctiva & lids, pupils equally reactive.   ENT: Normal exam of nasal/oral mucosa with absence of cyanosis  Neck: Normal exam of jugular veins, trachea & thyroid.   Chest: Normal lung exam with good air movement absence of wheezes, rales, or rhonchi                                                                        CV:  Auscultation: normal S1S2, RRR, no murmurs  Carotids: No Bruits Abdominal Aorta: normal                                                                     GI: Normal exam of abdomen with no noted masses or tenderness. +BSx4Q                                                                                            Extremities: Normal no evidence of cyanosis or deformity, Edema:   Lower Extremity Pulses: positive bl pedal pulses  SKIN : Normal exam to inspection & palpation                                                           LABS:                        14.5   7.5   )-----------( 172      ( 2018 05:32 )             42.5         142  |  108  |  24<H>  ----------------------------<  114<H>  4.2   |  25  |  1.00    Ca    8.6      2018 07:54    TPro  8.1  /  Alb  3.7  /  TBili  1.3<H>  /  DBili  x   /  AST  28  /  ALT  61  /  AlkPhos  166<H>      PT/INR - ( 2018 18:34 )   PT: 12.3 sec;   INR: 1.08 ratio         PTT - ( 2018 12:51 )  PTT:54.9 sec      Cardiac Cath:  prelim LAD - 80%, Cx - 80%, RCA - 99%    TTE:     Conclusions:  1. Normal mitral valve. Minimal mitral regurgitation.  2. Normal trileaflet aortic valve. No aortic valve  regurgitation seen.  3. Mildly dilated left atrium.  LA volume index = 38 cc/m2.  4. Proximal septal thickening, otherwise normal left  ventricular internal dimensions and wall thicknesses.  5. Endocardial visualization enhanced with intravenous  injection of Ultrasonic Enhancing Agent (Definity).  Mild  segmental left ventricular systolic dysfunction. The mid  inferolateral wall, and the basal  inferolateral wall are  hypokinetic.  6. Mild diastolic dysfunction (Stage I).  7. Normal right ventricular size and function.  *** No previous Echo exam.  ------------------------------------------------------------------------  Confirmed on  2018 - 20:32:28 by Carlos Mcnamara M.D.  ------------------------------------------------------------------------ History of Present Illness:    This is a 63 year old  male with history of hypertension, HLD, gout  and CAD s/p 3 stents about 10 years prior ( on Aspirin and Plavix);  significant FH for premature CAD, father  at 61 from MI.   Presents to [facility] ED with c/c of chest pain. The pain was described as a mild-moderate pressure like sensation in the center of chest and non-radiating, constant. Patient states he woke up around 6 am with the pain. Pain was non-radiating. Not associated with palpitations or SOB. Patient states he called 911 and EMS brought him to hospital. Patient states he was not given SL nitro. Pain subsided in about 20-30 minutes after taking " 2 baby aspirins".  Patient states he is compliant with his medications. He played soccer last week with no chest pain, SOB, or palpitations but has experienced CP a few times before, cardiologist Dr Levin, pt reports last NST was normal approx 2 years ago. in ED First troponin negative but 2nd elevated at 0.181; ECG shows no ischemic changes.  Transferred to [facility] for cardiac cath with possible intervention.  Presently asymptomatic in no chest pain.     Pt s/p cardiac cath which shows multivessel disease.    CT Surgery consulted for CABG oanh w/ Dr. 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  Unit(s)/Hr (10 mL/Hr) IV Continuous <Continuous>  metoprolol succinate  milliGRAM(s) Oral daily    MEDICATIONS  (PRN):  heparin  Injectable 5600 Unit(s) IV Push every 6 hours PRN For aPTT less than 40      Vital Signs Last 24 Hrs  T(C): 36.8 (18 @ 14:58), Max: 36.8 (18 @ 14:58)  T(F): 98.2 (18 @ 14:58), Max: 98.2 (11-18 @ 14:58)  HR: 68 (18 @ 14:58) (62 - 72)  BP: 144/83 (18 @ 14:58) (130/76 - 163/79)  RR: 18 (18 @ 14:58) (15 - 18)  SpO2: 98% (18 @ 14:58) (94% - 99%)           Daily Height in cm: 175.26 (2018 16:12)    Daily Weight in k.7 (2018 16:12)  Admit Wt: Drug Dosing Weight  Height (cm): 175.26 (2018 16:12)  Weight (kg): 90.7 (2018 16:12)  BMI (kg/m2): 29.5 (2018 16:12)  BSA (m2): 2.07 (2018 16:12)    Allergies: No Known Allergies      SOCIAL HISTORY:  Smoker: [ ] Yes  [ x ] No          ETOH use: [x ] Yes  [ ] No              FREQUENCY / QUANTITY: occasional  Ilicit Drug use:  [ ] Yes  [ x] No      Relevant Family History  FAMILY HISTORY:  Family history of acute myocardial infarction (Father): at age 61      Review of Systems  GENERAL:  no weakness, fatigue, fevers or chills  NEURO: no dizziness, numbness, tingling or weakness  SKIN: no itching, burning, rashes, or lesions   HEENT: no visual changes;  no headache, no vertigo, no recent colds  RESPIRATORY: no shortness of breath, no cough, sputum, wheezing, hemoptysis   CARDIOVASCULAR:  no chest pain,  or palpitations  GI: no abd pain. no N/V/D.  PERIPHERAL VASCULAR: no swelling, no tenderness, no erythema, no varicose veins.     PHYSICAL EXAM  General: Well nourished, well developed, NAD.                                              Neuro: Normal exam oriented to person/place & time with no focal motor or sensory  deficits.                    Eyes: Normal exam of conjunctiva & lids, pupils equally reactive.   ENT: Normal exam of nasal/oral mucosa with absence of cyanosis  Neck: Normal exam of jugular veins, trachea & thyroid.   Chest: Normal lung exam with good air movement absence of wheezes, rales, or rhonchi                                                                        CV:  Auscultation: normal S1S2, RRR, no murmurs  Carotids: No Bruits Abdominal Aorta: normal                                                                     GI: Normal exam of abdomen with no noted masses or tenderness. +BSx4Q                                                                                            Extremities: Normal no evidence of cyanosis or deformity, Edema:   Lower Extremity Pulses: positive bl pedal pulses  SKIN : Normal exam to inspection & palpation                                                           LABS:                        14.5   7.5   )-----------( 172      ( 2018 05:32 )             42.5         142  |  108  |  24<H>  ----------------------------<  114<H>  4.2   |  25  |  1.00    Ca    8.6      2018 07:54    TPro  8.1  /  Alb  3.7  /  TBili  1.3<H>  /  DBili  x   /  AST  28  /  ALT  61  /  AlkPhos  166<H>      PT/INR - ( 2018 18:34 )   PT: 12.3 sec;   INR: 1.08 ratio         PTT - ( 2018 12:51 )  PTT:54.9 sec      Cardiac Cath:  prelim LAD - 80%, Cx - 80%, RCA - 99%    TTE:     Conclusions:  1. Normal mitral valve. Minimal mitral regurgitation.  2. Normal trileaflet aortic valve. No aortic valve  regurgitation seen.  3. Mildly dilated left atrium.  LA volume index = 38 cc/m2.  4. Proximal septal thickening, otherwise normal left  ventricular internal dimensions and wall thicknesses.  5. Endocardial visualization enhanced with intravenous  injection of Ultrasonic Enhancing Agent (Definity).  Mild  segmental left ventricular systolic dysfunction. The mid  inferolateral wall, and the basal  inferolateral wall are  hypokinetic.  6. Mild diastolic dysfunction (Stage I).  7. Normal right ventricular size and function.  *** No previous Echo exam.  ------------------------------------------------------------------------  Confirmed on  2018 - 20:32:28 by Carlos Mcnamara M.D.  ------------------------------------------------------------------------

## 2018-11-30 NOTE — CONSULT NOTE ADULT - PROBLEM SELECTOR RECOMMENDATION 9
TTE  Carotid Duplex  CBC, BMP, Coags, TSH, Lipid Panel, repeat p2y12  hold plavix   MRSA/MSSA PCR, UA  Heparin GTT TTE  Carotid Duplex  CBC, BMP, Coags, TSH, HgA1c, Lipid Panel, repeat p2y12  hold plavix   MRSA/MSSA PCR, UA  Heparin GTT TTE  Carotid Duplex  CBC, BMP, Coags, TSH, HgA1c, Lipid Panel, Pro-BNP, repeat p2y12  hold plavix   MRSA/MSSA PCR, UA  Heparin GTT

## 2018-12-01 DIAGNOSIS — Q32.4 OTHER CONGENITAL MALFORMATIONS OF BRONCHUS: ICD-10-CM

## 2018-12-01 DIAGNOSIS — M25.522 PAIN IN LEFT ELBOW: ICD-10-CM

## 2018-12-01 DIAGNOSIS — E87.6 HYPOKALEMIA: ICD-10-CM

## 2018-12-01 LAB
ANION GAP SERPL CALC-SCNC: 15 MMOL/L — SIGNIFICANT CHANGE UP (ref 5–17)
APTT BLD: 61.5 SEC — HIGH (ref 27.5–36.3)
BUN SERPL-MCNC: 17 MG/DL — SIGNIFICANT CHANGE UP (ref 7–23)
CALCIUM SERPL-MCNC: 9.3 MG/DL — SIGNIFICANT CHANGE UP (ref 8.4–10.5)
CHLORIDE SERPL-SCNC: 103 MMOL/L — SIGNIFICANT CHANGE UP (ref 96–108)
CHOLEST SERPL-MCNC: 150 MG/DL — SIGNIFICANT CHANGE UP (ref 10–199)
CO2 SERPL-SCNC: 21 MMOL/L — LOW (ref 22–31)
CREAT SERPL-MCNC: 0.88 MG/DL — SIGNIFICANT CHANGE UP (ref 0.5–1.3)
GLUCOSE SERPL-MCNC: 111 MG/DL — HIGH (ref 70–99)
HCT VFR BLD CALC: 42.1 % — SIGNIFICANT CHANGE UP (ref 39–50)
HDLC SERPL-MCNC: 42 MG/DL — SIGNIFICANT CHANGE UP
HGB BLD-MCNC: 14.3 G/DL — SIGNIFICANT CHANGE UP (ref 13–17)
LIPID PNL WITH DIRECT LDL SERPL: 87 MG/DL — SIGNIFICANT CHANGE UP
MCHC RBC-ENTMCNC: 30.4 PG — SIGNIFICANT CHANGE UP (ref 27–34)
MCHC RBC-ENTMCNC: 33.9 GM/DL — SIGNIFICANT CHANGE UP (ref 32–36)
MCV RBC AUTO: 89.6 FL — SIGNIFICANT CHANGE UP (ref 80–100)
PA ADP PRP-ACNC: 77 PRU — LOW (ref 194–417)
PLATELET # BLD AUTO: 169 K/UL — SIGNIFICANT CHANGE UP (ref 150–400)
POTASSIUM SERPL-MCNC: 3.9 MMOL/L — SIGNIFICANT CHANGE UP (ref 3.5–5.3)
POTASSIUM SERPL-SCNC: 3.9 MMOL/L — SIGNIFICANT CHANGE UP (ref 3.5–5.3)
RBC # BLD: 4.7 M/UL — SIGNIFICANT CHANGE UP (ref 4.2–5.8)
RBC # FLD: 11.7 % — SIGNIFICANT CHANGE UP (ref 10.3–14.5)
SODIUM SERPL-SCNC: 139 MMOL/L — SIGNIFICANT CHANGE UP (ref 135–145)
TOTAL CHOLESTEROL/HDL RATIO MEASUREMENT: 3.6 RATIO — SIGNIFICANT CHANGE UP (ref 3.4–9.6)
TRIGL SERPL-MCNC: 106 MG/DL — SIGNIFICANT CHANGE UP (ref 10–149)
URATE SERPL-MCNC: 7.1 MG/DL — SIGNIFICANT CHANGE UP (ref 3.4–8.8)
WBC # BLD: 7.9 K/UL — SIGNIFICANT CHANGE UP (ref 3.8–10.5)
WBC # FLD AUTO: 7.9 K/UL — SIGNIFICANT CHANGE UP (ref 3.8–10.5)

## 2018-12-01 PROCEDURE — 93010 ELECTROCARDIOGRAM REPORT: CPT

## 2018-12-01 PROCEDURE — 99232 SBSQ HOSP IP/OBS MODERATE 35: CPT

## 2018-12-01 RX ORDER — POTASSIUM CHLORIDE 20 MEQ
40 PACKET (EA) ORAL ONCE
Qty: 0 | Refills: 0 | Status: COMPLETED | OUTPATIENT
Start: 2018-12-01 | End: 2018-12-01

## 2018-12-01 RX ORDER — ACETAMINOPHEN 500 MG
650 TABLET ORAL EVERY 6 HOURS
Qty: 0 | Refills: 0 | Status: DISCONTINUED | OUTPATIENT
Start: 2018-12-01 | End: 2018-12-03

## 2018-12-01 RX ADMIN — Medication 100 MILLIGRAM(S): at 06:05

## 2018-12-01 RX ADMIN — ATORVASTATIN CALCIUM 80 MILLIGRAM(S): 80 TABLET, FILM COATED ORAL at 22:06

## 2018-12-01 RX ADMIN — AMLODIPINE BESYLATE 5 MILLIGRAM(S): 2.5 TABLET ORAL at 06:05

## 2018-12-01 RX ADMIN — Medication 81 MILLIGRAM(S): at 12:31

## 2018-12-01 RX ADMIN — Medication 40 MILLIEQUIVALENT(S): at 15:08

## 2018-12-01 RX ADMIN — HEPARIN SODIUM 1200 UNIT(S)/HR: 5000 INJECTION INTRAVENOUS; SUBCUTANEOUS at 12:31

## 2018-12-01 NOTE — PROGRESS NOTE ADULT - SUBJECTIVE AND OBJECTIVE BOX
Subjective:  "My elbow aches..think it"s because I have to keep my arm  straight for IV"  L elbow warm, to touch, + tender, No erythema    Tele:      SR  60-70                          T(C): 36.7 (12-01-18 @ 04:46), Max: 36.8 (11-30-18 @ 14:58)  HR: 69 (12-01-18 @ 04:46) (66 - 75)  BP: 128/72 (12-01-18 @ 04:46) (128/72 - 145/82)  RR: 18 (12-01-18 @ 04:46) (18 - 18)  SpO2: 93% (12-01-18 @ 04:46) (93% - 98%)    LVEF: 50%    12-01    139  |  103  |  17  ----------------------------<  111<H>  3.9   |  21<L>  |  0.88    Ca    9.3      01 Dec 2018 05:59                                 14.3   7.9   )-----------( 169      ( 01 Dec 2018 05:59 )             42.1        PT/INR - ( 29 Nov 2018 18:34 )   PT: 12.3 sec;   INR: 1.08 ratio         PTT - ( 01 Dec 2018 05:59 )  PTT:61.5 sec      Assessment    Neuro: alert, no deficits    Pulm: clear bilaterally    CV: S1 S2  RRR    Abd: softly distended  non tender    Extremities: L elbow tender, warm to touch> no limitation of movement, no erythema  No lower extrem edema      MEDICATIONS  (STANDING):  amLODIPine   Tablet 5 milliGRAM(s) Oral daily  aspirin enteric coated 81 milliGRAM(s) Oral daily  atorvastatin 80 milliGRAM(s) Oral at bedtime  heparin  Infusion.  Unit(s)/Hr (10 mL/Hr) IV Continuous <Continuous>  metoprolol succinate  milliGRAM(s) Oral daily       PAST MEDICAL & SURGICAL HISTORY:  NSTEMI (non-ST elevated myocardial infarction)  CAD (coronary artery disease)  Gout  Hypercholesteremia  Hypertension  Coronary stent patent

## 2018-12-01 NOTE — PROGRESS NOTE ADULT - ASSESSMENT
This is a 63 year old  male with history of hypertension, HLD, gout  and CAD s/p 3 stents about 10 years prior ( on Aspirin and Plavix),    s/p cardiac cath 11/30 and found to have multivessel disease. Now for CABG eval/work up w/ Dr. Fishman.   12/1 uric acid level    Heparin gtt  CAD

## 2018-12-02 ENCOUNTER — TRANSCRIPTION ENCOUNTER (OUTPATIENT)
Age: 63
End: 2018-12-02

## 2018-12-02 LAB
ALBUMIN SERPL ELPH-MCNC: 4 G/DL — SIGNIFICANT CHANGE UP (ref 3.3–5)
ALP SERPL-CCNC: 147 U/L — HIGH (ref 40–120)
ALT FLD-CCNC: 41 U/L — SIGNIFICANT CHANGE UP (ref 10–45)
ANION GAP SERPL CALC-SCNC: 13 MMOL/L — SIGNIFICANT CHANGE UP (ref 5–17)
APTT BLD: 48.9 SEC — HIGH (ref 27.5–36.3)
APTT BLD: 50.6 SEC — HIGH (ref 27.5–36.3)
APTT BLD: 53.7 SEC — HIGH (ref 27.5–36.3)
AST SERPL-CCNC: 25 U/L — SIGNIFICANT CHANGE UP (ref 10–40)
BILIRUB SERPL-MCNC: 1.4 MG/DL — HIGH (ref 0.2–1.2)
BLD GP AB SCN SERPL QL: NEGATIVE — SIGNIFICANT CHANGE UP
BUN SERPL-MCNC: 13 MG/DL — SIGNIFICANT CHANGE UP (ref 7–23)
CALCIUM SERPL-MCNC: 9.5 MG/DL — SIGNIFICANT CHANGE UP (ref 8.4–10.5)
CHLORIDE SERPL-SCNC: 103 MMOL/L — SIGNIFICANT CHANGE UP (ref 96–108)
CO2 SERPL-SCNC: 23 MMOL/L — SIGNIFICANT CHANGE UP (ref 22–31)
CREAT SERPL-MCNC: 0.87 MG/DL — SIGNIFICANT CHANGE UP (ref 0.5–1.3)
GLUCOSE SERPL-MCNC: 125 MG/DL — HIGH (ref 70–99)
HCT VFR BLD CALC: 41.1 % — SIGNIFICANT CHANGE UP (ref 39–50)
HGB BLD-MCNC: 14.5 G/DL — SIGNIFICANT CHANGE UP (ref 13–17)
INR BLD: 1.15 RATIO — SIGNIFICANT CHANGE UP (ref 0.88–1.16)
MCHC RBC-ENTMCNC: 31.7 PG — SIGNIFICANT CHANGE UP (ref 27–34)
MCHC RBC-ENTMCNC: 35.3 GM/DL — SIGNIFICANT CHANGE UP (ref 32–36)
MCV RBC AUTO: 89.9 FL — SIGNIFICANT CHANGE UP (ref 80–100)
PA ADP PRP-ACNC: 140 PRU — LOW (ref 194–417)
PLATELET # BLD AUTO: 167 K/UL — SIGNIFICANT CHANGE UP (ref 150–400)
POTASSIUM SERPL-MCNC: 4 MMOL/L — SIGNIFICANT CHANGE UP (ref 3.5–5.3)
POTASSIUM SERPL-SCNC: 4 MMOL/L — SIGNIFICANT CHANGE UP (ref 3.5–5.3)
PROT SERPL-MCNC: 7.6 G/DL — SIGNIFICANT CHANGE UP (ref 6–8.3)
PROTHROM AB SERPL-ACNC: 13.2 SEC — HIGH (ref 10–12.9)
RBC # BLD: 4.57 M/UL — SIGNIFICANT CHANGE UP (ref 4.2–5.8)
RBC # FLD: 12.1 % — SIGNIFICANT CHANGE UP (ref 10.3–14.5)
RH IG SCN BLD-IMP: POSITIVE — SIGNIFICANT CHANGE UP
SODIUM SERPL-SCNC: 139 MMOL/L — SIGNIFICANT CHANGE UP (ref 135–145)
WBC # BLD: 9 K/UL — SIGNIFICANT CHANGE UP (ref 3.8–10.5)
WBC # FLD AUTO: 9 K/UL — SIGNIFICANT CHANGE UP (ref 3.8–10.5)

## 2018-12-02 RX ORDER — CHLORHEXIDINE GLUCONATE 213 G/1000ML
1 SOLUTION TOPICAL ONCE
Qty: 0 | Refills: 0 | Status: COMPLETED | OUTPATIENT
Start: 2018-12-02 | End: 2018-12-02

## 2018-12-02 RX ORDER — CHLORHEXIDINE GLUCONATE 213 G/1000ML
15 SOLUTION TOPICAL ONCE
Qty: 0 | Refills: 0 | Status: COMPLETED | OUTPATIENT
Start: 2018-12-02 | End: 2018-12-03

## 2018-12-02 RX ORDER — CEFUROXIME AXETIL 250 MG
1500 TABLET ORAL ONCE
Qty: 0 | Refills: 0 | Status: DISCONTINUED | OUTPATIENT
Start: 2018-12-02 | End: 2018-12-03

## 2018-12-02 RX ADMIN — Medication 81 MILLIGRAM(S): at 12:19

## 2018-12-02 RX ADMIN — Medication 650 MILLIGRAM(S): at 12:49

## 2018-12-02 RX ADMIN — CHLORHEXIDINE GLUCONATE 1 APPLICATION(S): 213 SOLUTION TOPICAL at 20:22

## 2018-12-02 RX ADMIN — AMLODIPINE BESYLATE 5 MILLIGRAM(S): 2.5 TABLET ORAL at 05:03

## 2018-12-02 RX ADMIN — HEPARIN SODIUM 1400 UNIT(S)/HR: 5000 INJECTION INTRAVENOUS; SUBCUTANEOUS at 07:44

## 2018-12-02 RX ADMIN — Medication 100 MILLIGRAM(S): at 05:03

## 2018-12-02 RX ADMIN — ATORVASTATIN CALCIUM 80 MILLIGRAM(S): 80 TABLET, FILM COATED ORAL at 21:39

## 2018-12-02 RX ADMIN — HEPARIN SODIUM 1400 UNIT(S)/HR: 5000 INJECTION INTRAVENOUS; SUBCUTANEOUS at 14:40

## 2018-12-02 RX ADMIN — Medication 650 MILLIGRAM(S): at 12:19

## 2018-12-02 RX ADMIN — Medication 650 MILLIGRAM(S): at 23:52

## 2018-12-02 RX ADMIN — Medication 650 MILLIGRAM(S): at 18:30

## 2018-12-02 RX ADMIN — Medication 650 MILLIGRAM(S): at 04:44

## 2018-12-02 RX ADMIN — HEPARIN SODIUM 1600 UNIT(S)/HR: 5000 INJECTION INTRAVENOUS; SUBCUTANEOUS at 20:48

## 2018-12-02 RX ADMIN — Medication 650 MILLIGRAM(S): at 18:00

## 2018-12-02 RX ADMIN — Medication 650 MILLIGRAM(S): at 05:18

## 2018-12-02 NOTE — PROGRESS NOTE ADULT - ASSESSMENT
This is a 63 year old  male with history of hypertension, HLD, gout  and CAD s/p 3 stents about 10 years prior ( on Aspirin and Plavix),    s/p cardiac cath 11/30 and found to have multivessel disease. Now for CABG eval/work up w/ Dr. Fishman.   12/1 uric acid level    Heparin gtt  CAD    NPO after midnight for CABG in am 12/3

## 2018-12-02 NOTE — PROGRESS NOTE ADULT - SUBJECTIVE AND OBJECTIVE BOX
Cardiac Surgery Pre-op Note:  CC: Patient is a 63y old  Male who presents with a chief complaint of 3 VCAD (01 Dec 2018 14:06)      Referring Physician:                                                                                             Surgeon: DR Fishman  Procedure: (Date) (Procedure)  CABG    Allergies: NKDA    HPI:  This is a 63 year old  male with history of hypertension, HLD, gout  and CAD s/p 3 stents about 10 years prior ( on Aspirin and Plavix);  significant FH for premature CAD, father  at 61 from MI.   Presents to [facility] ED this am with c/c of chest pain. The pain was described as a mild-moderate pressure like sensation in the center of chest and non-radiating, constant. Patient states he woke up around 6 am with the pain. Pain was non-radiating. Not associated with palpitations or SOB. Patient states he called 911 and EMS brought him to hospital. Patient states he was not given SL nitro. Pain subsided in about 20-30 minutes after taking " 2 baby aspirins".  Patient states he is compliant with his medications. He played soccer last week with no chest pain, SOB, or palpitations but has experienced CP a few times before, cardiologist Dr Levin, pt reports last NST was normal approx 2 years ago. in ED First troponin negative but 2nd elevated at 0.181; ECG shows no ischemic changes.  Transferred to [facility] for cardiac cath with possible intervention.  Presently asymptomatic in no chest pain. (2018 16:12)    PAST MEDICAL & SURGICAL HISTORY:  NSTEMI (non-ST elevated myocardial infarction)  CAD (coronary artery disease)  Gout  Hypercholesteremia  Hypertension  Coronary stent patent    MEDICATIONS  (STANDING):  amLODIPine   Tablet 5 milliGRAM(s) Oral daily  aspirin enteric coated 81 milliGRAM(s) Oral daily  atorvastatin 80 milliGRAM(s) Oral at bedtime  cefuroxime  IVPB 1500 milliGRAM(s) IV Intermittent once  chlorhexidine 0.12% Liquid 15 milliLiter(s) Swish and Spit once  chlorhexidine 4% Liquid 1 Application(s) Topical once  heparin  Infusion.  Unit(s)/Hr (10 mL/Hr) IV Continuous <Continuous>  metoprolol succinate  milliGRAM(s) Oral daily    MEDICATIONS  (PRN):  acetaminophen   Tablet .. 650 milliGRAM(s) Oral every 6 hours PRN Temp greater or equal to 38.5C (101.3F), Mild Pain (1 - 3)  heparin  Injectable 5600 Unit(s) IV Push every 6 hours PRN For aPTT less than 40    Labs:                        14.5   9.0   )-----------( 167      ( 02 Dec 2018 06:40 )             41.1     139  |  103  |  13  ----------------------------<  125<H>  4.0   |  23  |  0.87    Ca    9.5      02 Dec 2018 06:40  TPro  7.6  /  Alb  4.0  /  TBili  1.4<H>  /  DBili  x   /  AST  25  /  ALT  41  /  AlkPhos  147<H>  12  PT/INR - ( 02 Dec 2018 06:40 )   PT: 13.2 sec;   INR: 1.15 ratio    PTT - ( 02 Dec 2018 06:40 )  PTT:48.9 sec  Blood Type: ABO Interpretation: O ( @ 06:23)  HGB A1C: Hemoglobin A1C, Whole Blood: 5.7 % ( @ 21:29)  Prealbumin:   Pro-BNP: Serum Pro-Brain Natriuretic Peptide: 74 pg/mL ( @ 18:34)  Thyroid Panel:  @ 21:29/1.40  --/6.6/120    MRSA: MRSA PCR Result.: Dinesh ( @ 21:29)   / MSSA:     CXR:   EKG:  Carotid Duplex:    PFT's:    Echocardiogram:    Cardiac catheterization:    Gen: WN/WD NAD  Neuro: AAOx3, nonfocal  Pulm: CTA B/L  CV: RRR, S1S2 +systolic murmur  Abd: Soft, NT, ND +BS  Ext: No edema, + peripheral pulses    Pt has AICD/PPM [ ] Yes  [x ] No             Brand Name:  Pre-op Beta Blocker ordered within 24 hrs of surgery (CABG ONLY)?  [x ] Yes  [ ] No  If not, Why?  Type & Cross  [ ] Yes  [ ] No  NPO after Midnight [x ] Yes  [ ] No  Pre-op ABX ordered, to be taped on chart:  [ x] Yes  [ ] No     Hibiclens/Peridex ordered [x ] Yes  [ ] No  Intraop on Hold: PRBCs, CXR, EM [x ]   Consent obtained  [x ] Yes  [ ] No

## 2018-12-03 ENCOUNTER — APPOINTMENT (OUTPATIENT)
Dept: CARDIOTHORACIC SURGERY | Facility: HOSPITAL | Age: 63
End: 2018-12-03

## 2018-12-03 PROBLEM — I21.4 NON-ST ELEVATION (NSTEMI) MYOCARDIAL INFARCTION: Chronic | Status: ACTIVE | Noted: 2018-11-29

## 2018-12-03 PROBLEM — I25.10 ATHEROSCLEROTIC HEART DISEASE OF NATIVE CORONARY ARTERY WITHOUT ANGINA PECTORIS: Chronic | Status: ACTIVE | Noted: 2018-11-29

## 2018-12-03 LAB
ALBUMIN SERPL ELPH-MCNC: 3.9 G/DL — SIGNIFICANT CHANGE UP (ref 3.3–5)
ALP SERPL-CCNC: 86 U/L — SIGNIFICANT CHANGE UP (ref 40–120)
ALT FLD-CCNC: 59 U/L — HIGH (ref 10–45)
ANION GAP SERPL CALC-SCNC: 13 MMOL/L — SIGNIFICANT CHANGE UP (ref 5–17)
ANION GAP SERPL CALC-SCNC: 19 MMOL/L — HIGH (ref 5–17)
APTT BLD: 26.4 SEC — LOW (ref 27.5–36.3)
APTT BLD: 60.8 SEC — HIGH (ref 27.5–36.3)
APTT BLD: 65.5 SEC — HIGH (ref 27.5–36.3)
AST SERPL-CCNC: 39 U/L — SIGNIFICANT CHANGE UP (ref 10–40)
BASOPHILS # BLD AUTO: 0 K/UL — SIGNIFICANT CHANGE UP (ref 0–0.2)
BASOPHILS NFR BLD AUTO: 0.1 % — SIGNIFICANT CHANGE UP (ref 0–2)
BILIRUB SERPL-MCNC: 2.1 MG/DL — HIGH (ref 0.2–1.2)
BUN SERPL-MCNC: 13 MG/DL — SIGNIFICANT CHANGE UP (ref 7–23)
BUN SERPL-MCNC: 16 MG/DL — SIGNIFICANT CHANGE UP (ref 7–23)
CALCIUM SERPL-MCNC: 8.9 MG/DL — SIGNIFICANT CHANGE UP (ref 8.4–10.5)
CALCIUM SERPL-MCNC: 9.5 MG/DL — SIGNIFICANT CHANGE UP (ref 8.4–10.5)
CHLORIDE SERPL-SCNC: 102 MMOL/L — SIGNIFICANT CHANGE UP (ref 96–108)
CHLORIDE SERPL-SCNC: 102 MMOL/L — SIGNIFICANT CHANGE UP (ref 96–108)
CK MB BLD-MCNC: 9.9 % — HIGH (ref 0–3.5)
CK MB CFR SERPL CALC: 11.4 NG/ML — HIGH (ref 0–6.7)
CK SERPL-CCNC: 115 U/L — SIGNIFICANT CHANGE UP (ref 30–200)
CO2 SERPL-SCNC: 21 MMOL/L — LOW (ref 22–31)
CO2 SERPL-SCNC: 25 MMOL/L — SIGNIFICANT CHANGE UP (ref 22–31)
CREAT SERPL-MCNC: 0.82 MG/DL — SIGNIFICANT CHANGE UP (ref 0.5–1.3)
CREAT SERPL-MCNC: 0.9 MG/DL — SIGNIFICANT CHANGE UP (ref 0.5–1.3)
EOSINOPHIL # BLD AUTO: 0.2 K/UL — SIGNIFICANT CHANGE UP (ref 0–0.5)
EOSINOPHIL NFR BLD AUTO: 1.2 % — SIGNIFICANT CHANGE UP (ref 0–6)
FIBRINOGEN PPP-MCNC: 637 MG/DL — HIGH (ref 350–510)
GAS PNL BLDA: SIGNIFICANT CHANGE UP
GLUCOSE SERPL-MCNC: 106 MG/DL — HIGH (ref 70–99)
GLUCOSE SERPL-MCNC: 189 MG/DL — HIGH (ref 70–99)
HCT VFR BLD CALC: 31.8 % — LOW (ref 39–50)
HCT VFR BLD CALC: 40.8 % — SIGNIFICANT CHANGE UP (ref 39–50)
HGB BLD-MCNC: 11.8 G/DL — LOW (ref 13–17)
HGB BLD-MCNC: 14.1 G/DL — SIGNIFICANT CHANGE UP (ref 13–17)
INR BLD: 1.18 RATIO — HIGH (ref 0.88–1.16)
INR BLD: 1.36 RATIO — HIGH (ref 0.88–1.16)
LYMPHOCYTES # BLD AUTO: 19.2 % — SIGNIFICANT CHANGE UP (ref 13–44)
LYMPHOCYTES # BLD AUTO: 3.2 K/UL — SIGNIFICANT CHANGE UP (ref 1–3.3)
MAGNESIUM SERPL-MCNC: 2.3 MG/DL — SIGNIFICANT CHANGE UP (ref 1.6–2.6)
MCHC RBC-ENTMCNC: 31.2 PG — SIGNIFICANT CHANGE UP (ref 27–34)
MCHC RBC-ENTMCNC: 33.5 PG — SIGNIFICANT CHANGE UP (ref 27–34)
MCHC RBC-ENTMCNC: 34.6 GM/DL — SIGNIFICANT CHANGE UP (ref 32–36)
MCHC RBC-ENTMCNC: 37.1 GM/DL — HIGH (ref 32–36)
MCV RBC AUTO: 90.2 FL — SIGNIFICANT CHANGE UP (ref 80–100)
MCV RBC AUTO: 90.2 FL — SIGNIFICANT CHANGE UP (ref 80–100)
MONOCYTES # BLD AUTO: 1.3 K/UL — HIGH (ref 0–0.9)
MONOCYTES NFR BLD AUTO: 8 % — SIGNIFICANT CHANGE UP (ref 2–14)
NEUTROPHILS # BLD AUTO: 11.9 K/UL — HIGH (ref 1.8–7.4)
NEUTROPHILS NFR BLD AUTO: 71.4 % — SIGNIFICANT CHANGE UP (ref 43–77)
PA ADP PRP-ACNC: 177 PRU — LOW (ref 194–417)
PHOSPHATE SERPL-MCNC: 3.5 MG/DL — SIGNIFICANT CHANGE UP (ref 2.5–4.5)
PLATELET # BLD AUTO: 157 K/UL — SIGNIFICANT CHANGE UP (ref 150–400)
PLATELET # BLD AUTO: 171 K/UL — SIGNIFICANT CHANGE UP (ref 150–400)
POTASSIUM SERPL-MCNC: 3.9 MMOL/L — SIGNIFICANT CHANGE UP (ref 3.5–5.3)
POTASSIUM SERPL-MCNC: 4.5 MMOL/L — SIGNIFICANT CHANGE UP (ref 3.5–5.3)
POTASSIUM SERPL-SCNC: 3.9 MMOL/L — SIGNIFICANT CHANGE UP (ref 3.5–5.3)
POTASSIUM SERPL-SCNC: 4.5 MMOL/L — SIGNIFICANT CHANGE UP (ref 3.5–5.3)
PROT SERPL-MCNC: 6.3 G/DL — SIGNIFICANT CHANGE UP (ref 6–8.3)
PROTHROM AB SERPL-ACNC: 13.6 SEC — HIGH (ref 10–12.9)
PROTHROM AB SERPL-ACNC: 15.8 SEC — HIGH (ref 10–12.9)
RBC # BLD: 3.53 M/UL — LOW (ref 4.2–5.8)
RBC # BLD: 4.52 M/UL — SIGNIFICANT CHANGE UP (ref 4.2–5.8)
RBC # FLD: 11.4 % — SIGNIFICANT CHANGE UP (ref 10.3–14.5)
RBC # FLD: 12.2 % — SIGNIFICANT CHANGE UP (ref 10.3–14.5)
SODIUM SERPL-SCNC: 140 MMOL/L — SIGNIFICANT CHANGE UP (ref 135–145)
SODIUM SERPL-SCNC: 142 MMOL/L — SIGNIFICANT CHANGE UP (ref 135–145)
TROPONIN T, HIGH SENSITIVITY RESULT: 164 NG/L — HIGH (ref 0–51)
WBC # BLD: 16.6 K/UL — HIGH (ref 3.8–10.5)
WBC # BLD: 8.3 K/UL — SIGNIFICANT CHANGE UP (ref 3.8–10.5)
WBC # FLD AUTO: 16.6 K/UL — HIGH (ref 3.8–10.5)
WBC # FLD AUTO: 8.3 K/UL — SIGNIFICANT CHANGE UP (ref 3.8–10.5)

## 2018-12-03 PROCEDURE — 93010 ELECTROCARDIOGRAM REPORT: CPT

## 2018-12-03 PROCEDURE — 33508 ENDOSCOPIC VEIN HARVEST: CPT

## 2018-12-03 PROCEDURE — 99291 CRITICAL CARE FIRST HOUR: CPT

## 2018-12-03 PROCEDURE — 33508 ENDOSCOPIC VEIN HARVEST: CPT | Mod: AS

## 2018-12-03 PROCEDURE — 33519 CABG ARTERY-VEIN THREE: CPT

## 2018-12-03 PROCEDURE — 71045 X-RAY EXAM CHEST 1 VIEW: CPT | Mod: 26

## 2018-12-03 PROCEDURE — 33519 CABG ARTERY-VEIN THREE: CPT | Mod: AS

## 2018-12-03 PROCEDURE — 33533 CABG ARTERIAL SINGLE: CPT | Mod: AS

## 2018-12-03 PROCEDURE — 33533 CABG ARTERIAL SINGLE: CPT

## 2018-12-03 RX ORDER — CHLORHEXIDINE GLUCONATE 213 G/1000ML
5 SOLUTION TOPICAL EVERY 4 HOURS
Qty: 0 | Refills: 0 | Status: DISCONTINUED | OUTPATIENT
Start: 2018-12-03 | End: 2018-12-04

## 2018-12-03 RX ORDER — DEXTROSE 50 % IN WATER 50 %
25 SYRINGE (ML) INTRAVENOUS
Qty: 0 | Refills: 0 | Status: DISCONTINUED | OUTPATIENT
Start: 2018-12-03 | End: 2018-12-07

## 2018-12-03 RX ORDER — POTASSIUM CHLORIDE 20 MEQ
10 PACKET (EA) ORAL
Qty: 0 | Refills: 0 | Status: COMPLETED | OUTPATIENT
Start: 2018-12-03 | End: 2018-12-03

## 2018-12-03 RX ORDER — PANTOPRAZOLE SODIUM 20 MG/1
40 TABLET, DELAYED RELEASE ORAL DAILY
Qty: 0 | Refills: 0 | Status: DISCONTINUED | OUTPATIENT
Start: 2018-12-03 | End: 2018-12-04

## 2018-12-03 RX ORDER — ASPIRIN/CALCIUM CARB/MAGNESIUM 324 MG
325 TABLET ORAL DAILY
Qty: 0 | Refills: 0 | Status: DISCONTINUED | OUTPATIENT
Start: 2018-12-03 | End: 2018-12-07

## 2018-12-03 RX ORDER — HYDROMORPHONE HYDROCHLORIDE 2 MG/ML
1 INJECTION INTRAMUSCULAR; INTRAVENOUS; SUBCUTANEOUS ONCE
Qty: 0 | Refills: 0 | Status: DISCONTINUED | OUTPATIENT
Start: 2018-12-03 | End: 2018-12-03

## 2018-12-03 RX ORDER — POTASSIUM CHLORIDE 20 MEQ
10 PACKET (EA) ORAL
Qty: 0 | Refills: 0 | Status: DISCONTINUED | OUTPATIENT
Start: 2018-12-03 | End: 2018-12-07

## 2018-12-03 RX ORDER — MEPERIDINE HYDROCHLORIDE 50 MG/ML
25 INJECTION INTRAMUSCULAR; INTRAVENOUS; SUBCUTANEOUS ONCE
Qty: 0 | Refills: 0 | Status: DISCONTINUED | OUTPATIENT
Start: 2018-12-03 | End: 2018-12-04

## 2018-12-03 RX ORDER — CEFUROXIME AXETIL 250 MG
1500 TABLET ORAL EVERY 8 HOURS
Qty: 0 | Refills: 0 | Status: COMPLETED | OUTPATIENT
Start: 2018-12-03 | End: 2018-12-05

## 2018-12-03 RX ORDER — SODIUM CHLORIDE 9 MG/ML
1000 INJECTION INTRAMUSCULAR; INTRAVENOUS; SUBCUTANEOUS
Qty: 0 | Refills: 0 | Status: DISCONTINUED | OUTPATIENT
Start: 2018-12-03 | End: 2018-12-07

## 2018-12-03 RX ORDER — VASOPRESSIN 20 [USP'U]/ML
0.1 INJECTION INTRAVENOUS
Qty: 100 | Refills: 0 | Status: DISCONTINUED | OUTPATIENT
Start: 2018-12-03 | End: 2018-12-04

## 2018-12-03 RX ORDER — INSULIN HUMAN 100 [IU]/ML
2 INJECTION, SOLUTION SUBCUTANEOUS
Qty: 100 | Refills: 0 | Status: DISCONTINUED | OUTPATIENT
Start: 2018-12-03 | End: 2018-12-07

## 2018-12-03 RX ORDER — DOCUSATE SODIUM 100 MG
100 CAPSULE ORAL THREE TIMES A DAY
Qty: 0 | Refills: 0 | Status: DISCONTINUED | OUTPATIENT
Start: 2018-12-03 | End: 2018-12-07

## 2018-12-03 RX ORDER — DEXTROSE 50 % IN WATER 50 %
50 SYRINGE (ML) INTRAVENOUS
Qty: 0 | Refills: 0 | Status: DISCONTINUED | OUTPATIENT
Start: 2018-12-03 | End: 2018-12-07

## 2018-12-03 RX ORDER — NOREPINEPHRINE BITARTRATE/D5W 8 MG/250ML
0.05 PLASTIC BAG, INJECTION (ML) INTRAVENOUS
Qty: 8 | Refills: 0 | Status: DISCONTINUED | OUTPATIENT
Start: 2018-12-03 | End: 2018-12-04

## 2018-12-03 RX ADMIN — HYDROMORPHONE HYDROCHLORIDE 1 MILLIGRAM(S): 2 INJECTION INTRAMUSCULAR; INTRAVENOUS; SUBCUTANEOUS at 20:46

## 2018-12-03 RX ADMIN — Medication 650 MILLIGRAM(S): at 00:40

## 2018-12-03 RX ADMIN — Medication 100 MILLIEQUIVALENT(S): at 21:56

## 2018-12-03 RX ADMIN — Medication 100 MILLIEQUIVALENT(S): at 21:05

## 2018-12-03 RX ADMIN — Medication 650 MILLIGRAM(S): at 12:09

## 2018-12-03 RX ADMIN — HYDROMORPHONE HYDROCHLORIDE 1 MILLIGRAM(S): 2 INJECTION INTRAMUSCULAR; INTRAVENOUS; SUBCUTANEOUS at 21:00

## 2018-12-03 RX ADMIN — AMLODIPINE BESYLATE 5 MILLIGRAM(S): 2.5 TABLET ORAL at 05:59

## 2018-12-03 RX ADMIN — HEPARIN SODIUM 1600 UNIT(S)/HR: 5000 INJECTION INTRAVENOUS; SUBCUTANEOUS at 07:42

## 2018-12-03 RX ADMIN — CHLORHEXIDINE GLUCONATE 5 MILLILITER(S): 213 SOLUTION TOPICAL at 22:48

## 2018-12-03 RX ADMIN — Medication 100 MILLIEQUIVALENT(S): at 22:48

## 2018-12-03 RX ADMIN — Medication 650 MILLIGRAM(S): at 13:00

## 2018-12-03 RX ADMIN — Medication 650 MILLIGRAM(S): at 07:00

## 2018-12-03 RX ADMIN — HEPARIN SODIUM 1600 UNIT(S)/HR: 5000 INJECTION INTRAVENOUS; SUBCUTANEOUS at 03:06

## 2018-12-03 RX ADMIN — CHLORHEXIDINE GLUCONATE 15 MILLILITER(S): 213 SOLUTION TOPICAL at 12:09

## 2018-12-03 RX ADMIN — Medication 100 MILLIGRAM(S): at 05:59

## 2018-12-03 RX ADMIN — Medication 100 MILLIGRAM(S): at 22:49

## 2018-12-03 RX ADMIN — Medication 650 MILLIGRAM(S): at 05:59

## 2018-12-03 RX ADMIN — Medication 81 MILLIGRAM(S): at 12:09

## 2018-12-03 NOTE — PROGRESS NOTE ADULT - SUBJECTIVE AND OBJECTIVE BOX
8:30pm-9pm    Remained critically ill on continuos ICU monitoring.  OBJECTIVE:  ICU Vital Signs Last 24 Hrs  T(C): 36.5 (2018 05:21), Max: 36.5 (2018 05:21)  T(F): 97.7 (2018 05:21), Max: 97.7 (2018 05:21)  HR: 63 (2018 05:21) (62 - 63)  BP: 135/76 (2018 05:21) (135/76 - 157/87)  BP(mean): --  ABP: --  ABP(mean): --  RR: 18 (2018 05:21) (18 - 18)  SpO2: 96% (2018 05:21) (95% - 96%)    Mode: AC/ CMV (Assist Control/ Continuous Mandatory Ventilation), RR (machine): 12, TV (machine): 600, FiO2: 100, PEEP: 5, ITime: 1, MAP: 8, PIP: 21    11-15 @ 07:01  -   @ 07:00  --------------------------------------------------------  IN: 240 mL / OUT: 0 mL / NET: 240 mL      CAPILLARY BLOOD GLUCOSE          PHYSICAL EXAM:    Daily Weight in k.4 (2018 07:09)  General: intubated & sedated   Neurology: sedated nonfocal, ROBERTS x 4  Eyes: PERRLA/ EOMI, Gross vision intact  ENT/Neck: Neck supple,+ET trachea midline, No JVD, Gross hearing intact  Respiratory:  B/L fine  rales + sternal dressing M & L pleural chest tubes  CV: RRR, S1S2, no murmurs, rubs or gallops  Abdominal: Soft, NT, ND +BS,   Extremities: No edema, + peripheral pulses  Skin: No Rashes, Hematoma, Ecchymosis          HOSPITAL MEDICATIONS:  MEDICATIONS  (STANDING):    MEDICATIONS  (PRN):      LABS:                        15.4   6.0   )-----------( 291      ( 15 Nov 2018 12:02 )             47.1     11-15    143  |  106  |  18  ----------------------------<  106<H>  3.8   |  23  |  0.93    Ca    9.6      15 Nov 2018 12:02    TPro  8.0  /  Alb  5.0  /  TBili  0.8  /  DBili  0.1  /  AST  23  /  ALT  19  /  AlkPhos  91  11-15    PT/INR - ( 15 Nov 2018 16:02 )   PT: 10.7 sec;   INR: 0.94 ratio         PTT - ( 15 Nov 2018 16:02 )  PTT:39.8 sec          LIVER FUNCTIONS - ( 15 Nov 2018 16:02 )  Alb: 5.0 g/dL / Pro: 8.0 g/dL / ALK PHOS: 91 U/L / ALT: 19 U/L / AST: 23 U/L / GGT: x           MICROBIOLOGY:     RADIOLOGY:  X Reviewed and interpreted by me            Assessment and Plan:   · Assessment	  75 yr old male with history of HTN, CAD, Hyperlipidemia, S/P PCI, admitted with chest pain +stress test, cardiac cath multivessel CAD, S/P CABG x 4 pod # 0, post op hypovolumic     Plan f/u ABGs, Spo2, CXR, wean to extubate once hemodynamically stable.  SR, back up pacing in place, monitor for post op bleeding, ++ chest tube outputs.    Pressors to maintain MAP > 70 f/u perfusion indices, volume resuscitation  Glycemic control,   ASA & statin for CAD      I have spent 30 minutes providing critical care management to this patient.

## 2018-12-03 NOTE — AIRWAY REMOVAL NOTE  ADULT & PEDS - ARTIFICAL AIRWAY REMOVAL COMMENTS
Written order for extubation verified. The patient was identified by full name and birth date compared to the identification band. Present during the procedure was Ewa Tabares RN.

## 2018-12-03 NOTE — BRIEF OPERATIVE NOTE - PROCEDURE
<<-----Click on this checkbox to enter Procedure CABG (coronary artery bypass graft)  12/03/2018  Cardiac Cath revealed significant triple-vessel CAD and the patient was taken to the OR on 12/318 and underwent CABGX4 with LIMA to LAD; RSVG to OM, Ramus,. and RCA  Active  PGELLERT

## 2018-12-03 NOTE — PRE-ANESTHESIA EVALUATION ADULT - NSANTHOSAYNRD_GEN_A_CORE
No. SAULO screening performed.  STOP BANG Legend: 0-2 = LOW Risk; 3-4 = INTERMEDIATE Risk; 5-8 = HIGH Risk

## 2018-12-04 DIAGNOSIS — Z95.1 PRESENCE OF AORTOCORONARY BYPASS GRAFT: ICD-10-CM

## 2018-12-04 LAB
ALBUMIN SERPL ELPH-MCNC: 3.6 G/DL — SIGNIFICANT CHANGE UP (ref 3.3–5)
ALP SERPL-CCNC: 82 U/L — SIGNIFICANT CHANGE UP (ref 40–120)
ALT FLD-CCNC: 51 U/L — HIGH (ref 10–45)
ANION GAP SERPL CALC-SCNC: 14 MMOL/L — SIGNIFICANT CHANGE UP (ref 5–17)
APTT BLD: 32.8 SEC — SIGNIFICANT CHANGE UP (ref 27.5–36.3)
AST SERPL-CCNC: 41 U/L — HIGH (ref 10–40)
BILIRUB SERPL-MCNC: 1.7 MG/DL — HIGH (ref 0.2–1.2)
BUN SERPL-MCNC: 16 MG/DL — SIGNIFICANT CHANGE UP (ref 7–23)
CALCIUM SERPL-MCNC: 8.7 MG/DL — SIGNIFICANT CHANGE UP (ref 8.4–10.5)
CHLORIDE SERPL-SCNC: 107 MMOL/L — SIGNIFICANT CHANGE UP (ref 96–108)
CO2 SERPL-SCNC: 22 MMOL/L — SIGNIFICANT CHANGE UP (ref 22–31)
CREAT SERPL-MCNC: 0.79 MG/DL — SIGNIFICANT CHANGE UP (ref 0.5–1.3)
GAS PNL BLDA: SIGNIFICANT CHANGE UP
GAS PNL BLDA: SIGNIFICANT CHANGE UP
GLUCOSE BLDC GLUCOMTR-MCNC: 106 MG/DL — HIGH (ref 70–99)
GLUCOSE BLDC GLUCOMTR-MCNC: 114 MG/DL — HIGH (ref 70–99)
GLUCOSE BLDC GLUCOMTR-MCNC: 119 MG/DL — HIGH (ref 70–99)
GLUCOSE BLDC GLUCOMTR-MCNC: 127 MG/DL — HIGH (ref 70–99)
GLUCOSE BLDC GLUCOMTR-MCNC: 127 MG/DL — HIGH (ref 70–99)
GLUCOSE BLDC GLUCOMTR-MCNC: 128 MG/DL — HIGH (ref 70–99)
GLUCOSE BLDC GLUCOMTR-MCNC: 154 MG/DL — HIGH (ref 70–99)
GLUCOSE BLDC GLUCOMTR-MCNC: 160 MG/DL — HIGH (ref 70–99)
GLUCOSE BLDC GLUCOMTR-MCNC: 249 MG/DL — HIGH (ref 70–99)
GLUCOSE BLDV-MCNC: 115 MG/DL — HIGH (ref 70–99)
GLUCOSE BLDV-MCNC: 150 MG/DL — HIGH (ref 70–99)
GLUCOSE BLDV-MCNC: 156 MG/DL — HIGH (ref 70–99)
GLUCOSE BLDV-MCNC: 172 MG/DL — HIGH (ref 70–99)
GLUCOSE SERPL-MCNC: 145 MG/DL — HIGH (ref 70–99)
HCT VFR BLD CALC: 31.8 % — LOW (ref 39–50)
HGB BLD-MCNC: 11.3 G/DL — LOW (ref 13–17)
INR BLD: 1.31 RATIO — HIGH (ref 0.88–1.16)
MAGNESIUM SERPL-MCNC: 2.2 MG/DL — SIGNIFICANT CHANGE UP (ref 1.6–2.6)
MCHC RBC-ENTMCNC: 32.2 PG — SIGNIFICANT CHANGE UP (ref 27–34)
MCHC RBC-ENTMCNC: 35.5 GM/DL — SIGNIFICANT CHANGE UP (ref 32–36)
MCV RBC AUTO: 90.6 FL — SIGNIFICANT CHANGE UP (ref 80–100)
PHOSPHATE SERPL-MCNC: 2.6 MG/DL — SIGNIFICANT CHANGE UP (ref 2.5–4.5)
PLATELET # BLD AUTO: 134 K/UL — LOW (ref 150–400)
POTASSIUM SERPL-MCNC: 4.4 MMOL/L — SIGNIFICANT CHANGE UP (ref 3.5–5.3)
POTASSIUM SERPL-SCNC: 4.4 MMOL/L — SIGNIFICANT CHANGE UP (ref 3.5–5.3)
PROT SERPL-MCNC: 6.4 G/DL — SIGNIFICANT CHANGE UP (ref 6–8.3)
PROTHROM AB SERPL-ACNC: 15.2 SEC — HIGH (ref 10–12.9)
RBC # BLD: 3.51 M/UL — LOW (ref 4.2–5.8)
RBC # FLD: 11.6 % — SIGNIFICANT CHANGE UP (ref 10.3–14.5)
SODIUM SERPL-SCNC: 143 MMOL/L — SIGNIFICANT CHANGE UP (ref 135–145)
WBC # BLD: 8.4 K/UL — SIGNIFICANT CHANGE UP (ref 3.8–10.5)
WBC # FLD AUTO: 8.4 K/UL — SIGNIFICANT CHANGE UP (ref 3.8–10.5)

## 2018-12-04 PROCEDURE — 99291 CRITICAL CARE FIRST HOUR: CPT

## 2018-12-04 PROCEDURE — 93010 ELECTROCARDIOGRAM REPORT: CPT

## 2018-12-04 PROCEDURE — 71045 X-RAY EXAM CHEST 1 VIEW: CPT | Mod: 26

## 2018-12-04 RX ORDER — ATORVASTATIN CALCIUM 80 MG/1
40 TABLET, FILM COATED ORAL AT BEDTIME
Qty: 0 | Refills: 0 | Status: DISCONTINUED | OUTPATIENT
Start: 2018-12-04 | End: 2018-12-07

## 2018-12-04 RX ORDER — HYDROMORPHONE HYDROCHLORIDE 2 MG/ML
0.5 INJECTION INTRAMUSCULAR; INTRAVENOUS; SUBCUTANEOUS ONCE
Qty: 0 | Refills: 0 | Status: DISCONTINUED | OUTPATIENT
Start: 2018-12-04 | End: 2018-12-04

## 2018-12-04 RX ORDER — ENOXAPARIN SODIUM 100 MG/ML
40 INJECTION SUBCUTANEOUS DAILY
Qty: 0 | Refills: 0 | Status: DISCONTINUED | OUTPATIENT
Start: 2018-12-04 | End: 2018-12-07

## 2018-12-04 RX ORDER — OXYCODONE AND ACETAMINOPHEN 5; 325 MG/1; MG/1
1 TABLET ORAL EVERY 6 HOURS
Qty: 0 | Refills: 0 | Status: DISCONTINUED | OUTPATIENT
Start: 2018-12-04 | End: 2018-12-07

## 2018-12-04 RX ORDER — METOPROLOL TARTRATE 50 MG
50 TABLET ORAL EVERY 8 HOURS
Qty: 0 | Refills: 0 | Status: DISCONTINUED | OUTPATIENT
Start: 2018-12-04 | End: 2018-12-06

## 2018-12-04 RX ORDER — INSULIN LISPRO 100/ML
VIAL (ML) SUBCUTANEOUS
Qty: 0 | Refills: 0 | Status: DISCONTINUED | OUTPATIENT
Start: 2018-12-04 | End: 2018-12-07

## 2018-12-04 RX ORDER — METOPROLOL TARTRATE 50 MG
25 TABLET ORAL
Qty: 0 | Refills: 0 | Status: DISCONTINUED | OUTPATIENT
Start: 2018-12-04 | End: 2018-12-04

## 2018-12-04 RX ORDER — PANTOPRAZOLE SODIUM 20 MG/1
40 TABLET, DELAYED RELEASE ORAL
Qty: 0 | Refills: 0 | Status: DISCONTINUED | OUTPATIENT
Start: 2018-12-04 | End: 2018-12-07

## 2018-12-04 RX ORDER — METOCLOPRAMIDE HCL 10 MG
10 TABLET ORAL EVERY 8 HOURS
Qty: 0 | Refills: 0 | Status: COMPLETED | OUTPATIENT
Start: 2018-12-04 | End: 2018-12-05

## 2018-12-04 RX ORDER — INSULIN LISPRO 100/ML
VIAL (ML) SUBCUTANEOUS AT BEDTIME
Qty: 0 | Refills: 0 | Status: DISCONTINUED | OUTPATIENT
Start: 2018-12-04 | End: 2018-12-07

## 2018-12-04 RX ORDER — OXYCODONE AND ACETAMINOPHEN 5; 325 MG/1; MG/1
2 TABLET ORAL EVERY 6 HOURS
Qty: 0 | Refills: 0 | Status: DISCONTINUED | OUTPATIENT
Start: 2018-12-04 | End: 2018-12-07

## 2018-12-04 RX ORDER — CLOPIDOGREL BISULFATE 75 MG/1
75 TABLET, FILM COATED ORAL DAILY
Qty: 0 | Refills: 0 | Status: DISCONTINUED | OUTPATIENT
Start: 2018-12-04 | End: 2018-12-07

## 2018-12-04 RX ORDER — HUMAN INSULIN 100 [IU]/ML
6 INJECTION, SUSPENSION SUBCUTANEOUS ONCE
Qty: 0 | Refills: 0 | Status: COMPLETED | OUTPATIENT
Start: 2018-12-04 | End: 2018-12-04

## 2018-12-04 RX ADMIN — HYDROMORPHONE HYDROCHLORIDE 0.5 MILLIGRAM(S): 2 INJECTION INTRAMUSCULAR; INTRAVENOUS; SUBCUTANEOUS at 04:55

## 2018-12-04 RX ADMIN — HYDROMORPHONE HYDROCHLORIDE 0.5 MILLIGRAM(S): 2 INJECTION INTRAMUSCULAR; INTRAVENOUS; SUBCUTANEOUS at 02:15

## 2018-12-04 RX ADMIN — Medication 100 MILLIGRAM(S): at 15:34

## 2018-12-04 RX ADMIN — Medication 100 MILLIGRAM(S): at 22:13

## 2018-12-04 RX ADMIN — Medication 10 MILLIGRAM(S): at 13:52

## 2018-12-04 RX ADMIN — ENOXAPARIN SODIUM 40 MILLIGRAM(S): 100 INJECTION SUBCUTANEOUS at 12:17

## 2018-12-04 RX ADMIN — Medication 325 MILLIGRAM(S): at 00:49

## 2018-12-04 RX ADMIN — Medication 25 MILLIGRAM(S): at 05:55

## 2018-12-04 RX ADMIN — Medication 325 MILLIGRAM(S): at 12:17

## 2018-12-04 RX ADMIN — OXYCODONE AND ACETAMINOPHEN 2 TABLET(S): 5; 325 TABLET ORAL at 18:41

## 2018-12-04 RX ADMIN — Medication 50 MILLIGRAM(S): at 13:52

## 2018-12-04 RX ADMIN — HYDROMORPHONE HYDROCHLORIDE 0.5 MILLIGRAM(S): 2 INJECTION INTRAMUSCULAR; INTRAVENOUS; SUBCUTANEOUS at 05:10

## 2018-12-04 RX ADMIN — OXYCODONE AND ACETAMINOPHEN 1 TABLET(S): 5; 325 TABLET ORAL at 14:00

## 2018-12-04 RX ADMIN — Medication 100 MILLIGRAM(S): at 05:01

## 2018-12-04 RX ADMIN — ATORVASTATIN CALCIUM 40 MILLIGRAM(S): 80 TABLET, FILM COATED ORAL at 22:13

## 2018-12-04 RX ADMIN — CLOPIDOGREL BISULFATE 75 MILLIGRAM(S): 75 TABLET, FILM COATED ORAL at 12:17

## 2018-12-04 RX ADMIN — OXYCODONE AND ACETAMINOPHEN 2 TABLET(S): 5; 325 TABLET ORAL at 19:11

## 2018-12-04 RX ADMIN — Medication 100 MILLIGRAM(S): at 06:14

## 2018-12-04 RX ADMIN — Medication 10 MILLIGRAM(S): at 05:01

## 2018-12-04 RX ADMIN — HYDROMORPHONE HYDROCHLORIDE 0.5 MILLIGRAM(S): 2 INJECTION INTRAMUSCULAR; INTRAVENOUS; SUBCUTANEOUS at 02:00

## 2018-12-04 RX ADMIN — Medication 10 MILLIGRAM(S): at 22:13

## 2018-12-04 RX ADMIN — Medication 50 MILLIGRAM(S): at 22:13

## 2018-12-04 RX ADMIN — Medication 2: at 16:57

## 2018-12-04 RX ADMIN — Medication 1: at 13:52

## 2018-12-04 RX ADMIN — PANTOPRAZOLE SODIUM 40 MILLIGRAM(S): 20 TABLET, DELAYED RELEASE ORAL at 10:03

## 2018-12-04 RX ADMIN — OXYCODONE AND ACETAMINOPHEN 1 TABLET(S): 5; 325 TABLET ORAL at 13:00

## 2018-12-04 RX ADMIN — HUMAN INSULIN 6 UNIT(S): 100 INJECTION, SUSPENSION SUBCUTANEOUS at 09:57

## 2018-12-04 RX ADMIN — Medication 100 MILLIGRAM(S): at 13:52

## 2018-12-04 NOTE — PROGRESS NOTE ADULT - SUBJECTIVE AND OBJECTIVE BOX
VITAL SIGNS    Telemetry:    Vital Signs Last 24 Hrs  T(C): 36.4 (18 @ 15:14), Max: 37.3 (18 @ 00:00)  T(F): 97.5 (18 @ 15:14), Max: 99.1 (18 @ 00:00)  HR: 97 (18 @ 15:14) (72 - 107)  BP: 124/62 (18 @ 15:14) (124/62 - 124/62)  RR: 20 (18 @ 15:14) (12 - 38)  SpO2: 91% (18 @ 15:14) (91% - 100%)                       11.3<L>                143  | 22   | 16           8.4   >-----------< 134<L>   ------------------------< 145<H>                 31.8<L>                4.4  | 107  | 0.79                                                                      Ca 8.7   Mg 2.2   Ph 2.6      ,             11.8<L>                142  | 21<L>| 16           16.6<H> >-----------< 157     ------------------------< 189<H>                 31.8<L>                4.5  | 102  | 0.82                                                                      Ca 8.9   Mg 2.3   Ph 3.5                Daily     Daily Weight in k.3 (04 Dec 2018 01:00)        CAPILLARY BLOOD GLUCOSE  154 (04 Dec 2018 13:00)  127 (04 Dec 2018 12:00)  150 (04 Dec 2018 11:00)  172 (04 Dec 2018 10:00)  156 (04 Dec 2018 09:00)  115 (04 Dec 2018 08:00)  117 (04 Dec 2018 07:00)  127 (04 Dec 2018 06:00)  106 (04 Dec 2018 05:00)  112 (04 Dec 2018 04:00)  114 (04 Dec 2018 03:00)  128 (04 Dec 2018 02:00)  138 (04 Dec 2018 01:00)  157 (04 Dec 2018 00:00)  183 (03 Dec 2018 23:00)  184 (03 Dec 2018 22:00)  186 (03 Dec 2018 21:00)  182 (03 Dec 2018 20:00)      POCT Blood Glucose.: 154 mg/dL (04 Dec 2018 13:35)  POCT Blood Glucose.: 127 mg/dL (04 Dec 2018 12:16)  POCT Blood Glucose.: 119 mg/dL (04 Dec 2018 06:58)  POCT Blood Glucose.: 127 mg/dL (04 Dec 2018 06:10)  POCT Blood Glucose.: 106 mg/dL (04 Dec 2018 04:47)  POCT Blood Glucose.: 114 mg/dL (04 Dec 2018 03:01)  POCT Blood Glucose.: 128 mg/dL (04 Dec 2018 01:52)                Coumadin    [ ] YES          [  ]      NO                                   PHYSICAL EXAM        Neurology: alert and oriented x 3, nonfocal, no gross deficits  CV : .S1S2 RRR  Sternal Wound :  CDI , Stable  Pacing Wires        [  ]   Settings:                                  Isolated  [  ]  Lungs: bibasilar crackles   Drains:     MS         [  ] Drainage:                 L Pleural  [  ]  Drainage:                R Pleural  [  ]  Drainage:  Abdomen: soft, nontender, nondistended, positive bowel sounds, last bowel movement   :         voiding    Extremities:               aspirin enteric coated 325 milliGRAM(s) Oral daily  atorvastatin 40 milliGRAM(s) Oral at bedtime  cefuroxime  IVPB 1500 milliGRAM(s) IV Intermittent every 8 hours  clopidogrel Tablet 75 milliGRAM(s) Oral daily  dextrose 50% Injectable 50 milliLiter(s) IV Push every 15 minutes  dextrose 50% Injectable 25 milliLiter(s) IV Push every 15 minutes  docusate sodium 100 milliGRAM(s) Oral three times a day  enoxaparin Injectable 40 milliGRAM(s) SubCutaneous daily  insulin Infusion 2 Unit(s)/Hr IV Continuous <Continuous>  insulin lispro (HumaLOG) corrective regimen sliding scale   SubCutaneous three times a day before meals  insulin lispro (HumaLOG) corrective regimen sliding scale   SubCutaneous at bedtime  metoclopramide Injectable 10 milliGRAM(s) IV Push every 8 hours  metoprolol tartrate 50 milliGRAM(s) Oral every 8 hours  oxyCODONE    5 mG/acetaminophen 325 mG 1 Tablet(s) Oral every 6 hours PRN  oxyCODONE    5 mG/acetaminophen 325 mG 2 Tablet(s) Oral every 6 hours PRN  pantoprazole    Tablet 40 milliGRAM(s) Oral before breakfast  potassium chloride  10 mEq/50 mL IVPB 10 milliEquivalent(s) IV Intermittent every 1 hour  potassium chloride  10 mEq/50 mL IVPB 10 milliEquivalent(s) IV Intermittent every 1 hour  potassium chloride  10 mEq/50 mL IVPB 10 milliEquivalent(s) IV Intermittent every 1 hour  sodium chloride 0.9%. 1000 milliLiter(s) IV Continuous <Continuous>                    Physical Therapy Rec:   Home  [  ]   Home w/ PT  [  ]  Rehab  [  ]  Discussed with Cardiothoracic Team at AM rounds. im opk    VITAL SIGNS    Telemetry:  nsr 90  Vital Signs Last 24 Hrs  T(C): 36.4 (18 @ 15:14), Max: 37.3 (18 @ 00:00)  T(F): 97.5 (18 @ 15:14), Max: 99.1 (18 @ 00:00)  HR: 97 (18 @ 15:14) (72 - 107)  BP: 124/62 (18 @ 15:14) (124/62 - 124/62)  RR: 20 (18 @ 15:14) (12 - 38)  SpO2: 91% (18 @ 15:14) (91% - 100%)                       11.3<L>                143  | 22   | 16           8.4   >-----------< 134<L>   ------------------------< 145<H>                 31.8<L>                4.4  | 107  | 0.79                                                                      Ca 8.7   Mg 2.2   Ph 2.6      ,             11.8<L>                142  | 21<L>| 16           16.6<H> >-----------< 157     ------------------------< 189<H>                 31.8<L>                4.5  | 102  | 0.82                                                                      Ca 8.9   Mg 2.3   Ph 3.5                Daily     Daily Weight in k.3 (04 Dec 2018 01:00)        CAPILLARY BLOOD GLUCOSE  154 (04 Dec 2018 13:00)  127 (04 Dec 2018 12:00)  150 (04 Dec 2018 11:00)  172 (04 Dec 2018 10:00)  156 (04 Dec 2018 09:00)  115 (04 Dec 2018 08:00)  117 (04 Dec 2018 07:00)  127 (04 Dec 2018 06:00)  106 (04 Dec 2018 05:00)  112 (04 Dec 2018 04:00)  114 (04 Dec 2018 03:00)  128 (04 Dec 2018 02:00)  138 (04 Dec 2018 01:00)  157 (04 Dec 2018 00:00)  183 (03 Dec 2018 23:00)  184 (03 Dec 2018 22:00)  186 (03 Dec 2018 21:00)  182 (03 Dec 2018 20:00)      POCT Blood Glucose.: 154 mg/dL (04 Dec 2018 13:35)  POCT Blood Glucose.: 127 mg/dL (04 Dec 2018 12:16)  POCT Blood Glucose.: 119 mg/dL (04 Dec 2018 06:58)  POCT Blood Glucose.: 127 mg/dL (04 Dec 2018 06:10)  POCT Blood Glucose.: 106 mg/dL (04 Dec 2018 04:47)  POCT Blood Glucose.: 114 mg/dL (04 Dec 2018 03:01)  POCT Blood Glucose.: 128 mg/dL (04 Dec 2018 01:52)                Coumadin    [ ] YES          [  ]      NO                                   PHYSICAL EXAM        Neurology: alert and oriented x 3, nonfocal, no gross deficits  CV : .S1S2 RRR  Sternal Wound :  CDI , Stable  Pacing Wires        [  x]   Settings:                                  Isolated  [  ]  Lungs: bibasilar crackles   Drains:     MS         [  ] Drainage:                 L Pleural  [x  ]  Drainage:                R Pleural  [  ]  Drainage:  Abdomen: soft, nontender, nondistended, positive bowel sounds, last bowel movement preop  :         voiding    Extremities:   - edema - calv etenderness          aspirin enteric coated 325 milliGRAM(s) Oral daily  atorvastatin 40 milliGRAM(s) Oral at bedtime  cefuroxime  IVPB 1500 milliGRAM(s) IV Intermittent every 8 hours  clopidogrel Tablet 75 milliGRAM(s) Oral daily  dextrose 50% Injectable 50 milliLiter(s) IV Push every 15 minutes  dextrose 50% Injectable 25 milliLiter(s) IV Push every 15 minutes  docusate sodium 100 milliGRAM(s) Oral three times a day  enoxaparin Injectable 40 milliGRAM(s) SubCutaneous daily  insulin Infusion 2 Unit(s)/Hr IV Continuous <Continuous>  insulin lispro (HumaLOG) corrective regimen sliding scale   SubCutaneous three times a day before meals  insulin lispro (HumaLOG) corrective regimen sliding scale   SubCutaneous at bedtime  metoclopramide Injectable 10 milliGRAM(s) IV Push every 8 hours  metoprolol tartrate 50 milliGRAM(s) Oral every 8 hours  oxyCODONE    5 mG/acetaminophen 325 mG 1 Tablet(s) Oral every 6 hours PRN  oxyCODONE    5 mG/acetaminophen 325 mG 2 Tablet(s) Oral every 6 hours PRN  pantoprazole    Tablet 40 milliGRAM(s) Oral before breakfast  potassium chloride  10 mEq/50 mL IVPB 10 milliEquivalent(s) IV Intermittent every 1 hour  potassium chloride  10 mEq/50 mL IVPB 10 milliEquivalent(s) IV Intermittent every 1 hour  potassium chloride  10 mEq/50 mL IVPB 10 milliEquivalent(s) IV Intermittent every 1 hour  sodium chloride 0.9%. 1000 milliLiter(s) IV Continuous <Continuous>                    Physical Therapy Rec:   Home  [  ]   Home w/ PT  [  ]  Rehab  [  ]  Discussed with Cardiothoracic Team at AM rounds.

## 2018-12-04 NOTE — PHYSICAL THERAPY INITIAL EVALUATION ADULT - PLANNED THERAPY INTERVENTIONS, PT EVAL
balance training/transfer training/gait training/stair negotiation GOAL: pt will ascend/descend 6 steps (I) with U HR and step to pattern in 2 weeks/bed mobility training

## 2018-12-04 NOTE — PROGRESS NOTE ADULT - SUBJECTIVE AND OBJECTIVE BOX
DAVIE SANABRIA   MRN#: [id number]     The patient is a 63y Male who was seen, evaluated, & examined with the CTICU staff on rounds and later in the day with a multidisciplinary care plan formulated & implemented.  All available clinical, laboratory, radiographic, pharmacologic, and electrocardiographic data were reviewed & analyzed.      The patient was in the CTICU in critical condition secondary to persistent cardiopulmonary dysfunction, resolved hemodynamically significant hypovolemia-shock, & stress hyperglycemia.      Respiratory status required supplemental oxygen, the following of ABG’s with A-line monitoring, and continuous pulse oximetry monitoring for support & to evaluate for & prevent further decompensation secondary to persistent cardiopulmonary dysfunction.     Invasive hemodynamic monitoring with an A-line was required for the following of continuous MAP/BP monitoring to ensure adequate cardiovascular support and to evaluate for & help prevent decompensation while receiving intermittent volume expansion secondary to hemodynamically significant hypovolemia-shock and resolving hyperlactatatemia-acidosis.       Metabolic stability, stress hyperglycemia, & infection prophylaxis required a regular Insulin sliding scale, cessation of the IV Insulin drip & the following of serial glucose levels to help achieve & maintain euglycemia.      Patient required critical care management and I provided 30 minutes of non-continuous care to the patient.  Discussed at length with the CTICU staff and helped coordinate care.

## 2018-12-04 NOTE — PHYSICAL THERAPY INITIAL EVALUATION ADULT - PRECAUTIONS/LIMITATIONS, REHAB EVAL
2 years ago. In ED First troponin negative but 2nd elevated at 0.181; ECG shows no ischemic changes. Transferred to [facility] for cardiac cath which revealed 3V disease. Pt now s/p CABG x4 on 12/3 with LIMA to LAD; RSVG to OM, Ramus, and RCA. c/b fluid resuscitation 2/2 hypotension/cardiac precautions/sternal precautions

## 2018-12-04 NOTE — PHYSICAL THERAPY INITIAL EVALUATION ADULT - GENERAL OBSERVATIONS, REHAB EVAL
Pt rec'd sitting in bedside chair in SDU in NAD, VSS, +cardiac monitoring, +chest tube, agreeable to PT session

## 2018-12-04 NOTE — PHYSICAL THERAPY INITIAL EVALUATION ADULT - ADDITIONAL COMMENTS
Pt lives in a  with his spouse +6 steps to enter with HR, all needs met on main floor. Pt was ambulating independently with no AD and was independent with all ADLs prior, was active and playing soccer prior to surgery.

## 2018-12-04 NOTE — PHYSICAL THERAPY INITIAL EVALUATION ADULT - PERTINENT HX OF CURRENT PROBLEM, REHAB EVAL
62 yo M PMH HTN, HLD, gout and CAD s/p 3 stents about 10 years prior. Initially presented to [facility] ED with c/o of chest pain. States he woke up around 6 am with the pain. Pt reports he played soccer last week with no chest pain, SOB, or palpitations but has experienced CP a few times before, cardiologist Dr Levin reports last NST was normal approx

## 2018-12-04 NOTE — PROGRESS NOTE ADULT - ASSESSMENT
This is a 63 year old  male with history of hypertension, HLD, gout  and CAD s/p 3 stents about 10 years prior ( on Aspirin and Plavix); .   Presents to [facility] ED this am with c/c of chest pain.. Patient states he woke up around 6 am with the pain. Pain was non-radiating. Not associated with palpitations or SOB. Patient states he called 911 and EMS brought him to hospital. Patient states he was not given SL nitro. Pain subsided in about 20-30 minutes after taking " 2 baby aspirins".  Patient states he is compliant with his medications. He played soccer last week with no chest pain, SOB, or palpitations but has experienced CP a few times before, cardiologist Dr Levin, pt reports last NST was normal approx 2 years ago. in ED [facility] troponin negative but 2nd elevated at 0.181; ECG shows no ischemic changes.  Transferred to [facility] for cardiac cath with possible intervention.  Presently asymptomatic in no chest pain.     s/p cath 3 vessel disease  [facility] consult w Dr Mckeon   12/3/18 and underwent CABGX4 with LIMA to LAD; RSVG to OM, Ramus,. and RCA   + hypotension, fluid resuscitation  Extubated d 0  Iv insulin weaned off This is a 63 year old  male with history of hypertension, HLD, gout  and CAD s/p 3 stents about 10 years prior ( on Aspirin and Plavix); .   Presents to [facility] ED this am with c/c of chest pain.. Patient states he woke up around 6 am with the pain. Pain was non-radiating. Not associated with palpitations or SOB. Patient states he called 911 and EMS brought him to hospital. Patient states he was not given SL nitro. Pain subsided in about 20-30 minutes after taking " 2 baby aspirins".  Patient states he is compliant with his medications. He played soccer last week with no chest pain, SOB, or palpitations but has experienced CP a few times before, cardiologist Dr Levin, pt reports last NST was normal approx 2 years ago. in ED [facility] troponin negative but 2nd elevated at 0.181; ECG shows no ischemic changes.  Transferred to [facility] for cardiac cath with possible intervention.  Presently asymptomatic in no chest pain.     s/p cath 3 vessel disease  CVS consult w Dr Mckeon   12/3/18 and underwent CABGX4 with LIMA to LAD; RSVG to OM, Ramus,. and RCA   + hypotension, fluid resuscitation  Extubated d 0  Iv insulin weaned off   Transferred to sdu

## 2018-12-05 LAB
ALBUMIN SERPL ELPH-MCNC: 3.3 G/DL — SIGNIFICANT CHANGE UP (ref 3.3–5)
ALP SERPL-CCNC: 78 U/L — SIGNIFICANT CHANGE UP (ref 40–120)
ALT FLD-CCNC: 49 U/L — HIGH (ref 10–45)
ANION GAP SERPL CALC-SCNC: 11 MMOL/L — SIGNIFICANT CHANGE UP (ref 5–17)
AST SERPL-CCNC: 57 U/L — HIGH (ref 10–40)
BILIRUB SERPL-MCNC: 0.6 MG/DL — SIGNIFICANT CHANGE UP (ref 0.2–1.2)
BUN SERPL-MCNC: 24 MG/DL — HIGH (ref 7–23)
CALCIUM SERPL-MCNC: 8.8 MG/DL — SIGNIFICANT CHANGE UP (ref 8.4–10.5)
CHLORIDE SERPL-SCNC: 105 MMOL/L — SIGNIFICANT CHANGE UP (ref 96–108)
CO2 SERPL-SCNC: 24 MMOL/L — SIGNIFICANT CHANGE UP (ref 22–31)
CREAT SERPL-MCNC: 0.76 MG/DL — SIGNIFICANT CHANGE UP (ref 0.5–1.3)
GLUCOSE BLDC GLUCOMTR-MCNC: 155 MG/DL — HIGH (ref 70–99)
GLUCOSE BLDC GLUCOMTR-MCNC: 155 MG/DL — HIGH (ref 70–99)
GLUCOSE BLDC GLUCOMTR-MCNC: 158 MG/DL — HIGH (ref 70–99)
GLUCOSE BLDC GLUCOMTR-MCNC: 186 MG/DL — HIGH (ref 70–99)
GLUCOSE SERPL-MCNC: 155 MG/DL — HIGH (ref 70–99)
HCT VFR BLD CALC: 27.1 % — LOW (ref 39–50)
HGB BLD-MCNC: 9 G/DL — LOW (ref 13–17)
MCHC RBC-ENTMCNC: 31 PG — SIGNIFICANT CHANGE UP (ref 27–34)
MCHC RBC-ENTMCNC: 33.2 GM/DL — SIGNIFICANT CHANGE UP (ref 32–36)
MCV RBC AUTO: 93.4 FL — SIGNIFICANT CHANGE UP (ref 80–100)
PLATELET # BLD AUTO: 159 K/UL — SIGNIFICANT CHANGE UP (ref 150–400)
POTASSIUM SERPL-MCNC: 4.7 MMOL/L — SIGNIFICANT CHANGE UP (ref 3.5–5.3)
POTASSIUM SERPL-SCNC: 4.7 MMOL/L — SIGNIFICANT CHANGE UP (ref 3.5–5.3)
PROT SERPL-MCNC: 6.1 G/DL — SIGNIFICANT CHANGE UP (ref 6–8.3)
RBC # BLD: 2.9 M/UL — LOW (ref 4.2–5.8)
RBC # FLD: 13.4 % — SIGNIFICANT CHANGE UP (ref 10.3–14.5)
SODIUM SERPL-SCNC: 140 MMOL/L — SIGNIFICANT CHANGE UP (ref 135–145)
WBC # BLD: 10.29 K/UL — SIGNIFICANT CHANGE UP (ref 3.8–10.5)
WBC # FLD AUTO: 10.29 K/UL — SIGNIFICANT CHANGE UP (ref 3.8–10.5)

## 2018-12-05 PROCEDURE — 71045 X-RAY EXAM CHEST 1 VIEW: CPT | Mod: 26,76

## 2018-12-05 RX ADMIN — PANTOPRAZOLE SODIUM 40 MILLIGRAM(S): 20 TABLET, DELAYED RELEASE ORAL at 05:36

## 2018-12-05 RX ADMIN — OXYCODONE AND ACETAMINOPHEN 2 TABLET(S): 5; 325 TABLET ORAL at 12:34

## 2018-12-05 RX ADMIN — ENOXAPARIN SODIUM 40 MILLIGRAM(S): 100 INJECTION SUBCUTANEOUS at 12:33

## 2018-12-05 RX ADMIN — OXYCODONE AND ACETAMINOPHEN 2 TABLET(S): 5; 325 TABLET ORAL at 13:25

## 2018-12-05 RX ADMIN — Medication 1: at 07:50

## 2018-12-05 RX ADMIN — Medication 325 MILLIGRAM(S): at 12:33

## 2018-12-05 RX ADMIN — Medication 100 MILLIGRAM(S): at 05:36

## 2018-12-05 RX ADMIN — Medication 10 MILLIGRAM(S): at 05:37

## 2018-12-05 RX ADMIN — Medication 100 MILLIGRAM(S): at 13:35

## 2018-12-05 RX ADMIN — Medication 50 MILLIGRAM(S): at 13:35

## 2018-12-05 RX ADMIN — Medication 10 MILLIGRAM(S): at 13:35

## 2018-12-05 RX ADMIN — OXYCODONE AND ACETAMINOPHEN 2 TABLET(S): 5; 325 TABLET ORAL at 00:44

## 2018-12-05 RX ADMIN — Medication 50 MILLIGRAM(S): at 22:07

## 2018-12-05 RX ADMIN — Medication 100 MILLIGRAM(S): at 22:07

## 2018-12-05 RX ADMIN — Medication 1: at 12:33

## 2018-12-05 RX ADMIN — Medication 10 MILLIGRAM(S): at 22:07

## 2018-12-05 RX ADMIN — Medication 50 MILLIGRAM(S): at 05:37

## 2018-12-05 RX ADMIN — ATORVASTATIN CALCIUM 40 MILLIGRAM(S): 80 TABLET, FILM COATED ORAL at 22:07

## 2018-12-05 RX ADMIN — Medication 100 MILLIGRAM(S): at 05:37

## 2018-12-05 RX ADMIN — CLOPIDOGREL BISULFATE 75 MILLIGRAM(S): 75 TABLET, FILM COATED ORAL at 12:33

## 2018-12-05 RX ADMIN — OXYCODONE AND ACETAMINOPHEN 2 TABLET(S): 5; 325 TABLET ORAL at 01:14

## 2018-12-05 RX ADMIN — Medication 1: at 17:11

## 2018-12-05 NOTE — DIETITIAN INITIAL EVALUATION ADULT. - ENERGY NEEDS
Ht: 68.9 Wt: 200.5 pounds BMI: 29.7 kg/m2 IBW: 160  pounds(+/-10%)  +1 generalized edema. No pressure ulcers documented. - - -

## 2018-12-05 NOTE — DIETITIAN INITIAL EVALUATION ADULT. - OTHER INFO
Pt seen for length of stay. Per chart, pt s/p CABG x4 12/3/2018. Pt reports appetite improving and good PO intake, consuming >75% of meals. Pt denies any history of chewing/swallowing difficulty or GI distress at this time. Pt amenable to heart healthy nutrition education and written materials.

## 2018-12-05 NOTE — PROGRESS NOTE ADULT - SUBJECTIVE AND OBJECTIVE BOX
im ok    VITAL SIGNS    Telemetry:  nsr 80  Vital Signs Last 24 Hrs  T(C): 36.7 (18 @ 07:56), Max: 37.2 (18 @ 12:00)  T(F): 98 (18 @ 07:56), Max: 98.9 (18 @ 12:00)  HR: 73 (18 @ 07:56) (69 - 97)  BP: 110/64 (18 @ 07:56) (108/63 - 124/62)  RR: 18 (18 @ 07:56) (18 - 38)  SpO2: 93% (18 @ 07:56) (91% - 96%)                       9.0<L>                x    | x    | x            10.29 >-----------< 159     ------------------------< x                     27.1<L>                x    | x    | x                                                                         Ca x     Mg x     Ph x        ,             x                    140  | 24   | 24<H>        x     >-----------< x       ------------------------< 155<H>                 x                    4.7  | 105  | 0.76                                                                      Ca 8.8   Mg x     Ph x                  Daily     Daily Weight in k.3 (05 Dec 2018 07:56)        CAPILLARY BLOOD GLUCOSE  154 (04 Dec 2018 13:00)  127 (04 Dec 2018 12:00)  150 (04 Dec 2018 11:00)  172 (04 Dec 2018 10:00)      POCT Blood Glucose.: 158 mg/dL (05 Dec 2018 07:40)  POCT Blood Glucose.: 160 mg/dL (04 Dec 2018 21:50)  POCT Blood Glucose.: 249 mg/dL (04 Dec 2018 16:43)  POCT Blood Glucose.: 154 mg/dL (04 Dec 2018 13:35)  POCT Blood Glucose.: 127 mg/dL (04 Dec 2018 12:16)                Coumadin    [ ] YES          [ x ]      NO                                   PHYSICAL EXAM        Neurology: alert and oriented x 3, nonfocal, no gross deficits  CV : .S1S2 RRR  Sternal Wound :  CDI , Stable  Pacing Wires        [ x ]   Settings:vvi                               Isolated  [  ]  Lungs: diminished   Drains:     MS         [  ] Drainage:                 L Pleural  [ x ]  Drainage:    40/140            R Pleural  [  ]  Drainage:  Abdomen: soft, nontender, nondistended, positive bowel sounds, last bowel movement preop  :         voiding    Extremities:  trace edema - calve tenderness             aspirin enteric coated 325 milliGRAM(s) Oral daily  atorvastatin 40 milliGRAM(s) Oral at bedtime  clopidogrel Tablet 75 milliGRAM(s) Oral daily  dextrose 50% Injectable 50 milliLiter(s) IV Push every 15 minutes  dextrose 50% Injectable 25 milliLiter(s) IV Push every 15 minutes  docusate sodium 100 milliGRAM(s) Oral three times a day  enoxaparin Injectable 40 milliGRAM(s) SubCutaneous daily  insulin Infusion 2 Unit(s)/Hr IV Continuous <Continuous>  insulin lispro (HumaLOG) corrective regimen sliding scale   SubCutaneous three times a day before meals  insulin lispro (HumaLOG) corrective regimen sliding scale   SubCutaneous at bedtime  metoclopramide Injectable 10 milliGRAM(s) IV Push every 8 hours  metoprolol tartrate 50 milliGRAM(s) Oral every 8 hours  oxyCODONE    5 mG/acetaminophen 325 mG 1 Tablet(s) Oral every 6 hours PRN  oxyCODONE    5 mG/acetaminophen 325 mG 2 Tablet(s) Oral every 6 hours PRN  pantoprazole    Tablet 40 milliGRAM(s) Oral before breakfast  potassium chloride  10 mEq/50 mL IVPB 10 milliEquivalent(s) IV Intermittent every 1 hour  potassium chloride  10 mEq/50 mL IVPB 10 milliEquivalent(s) IV Intermittent every 1 hour  potassium chloride  10 mEq/50 mL IVPB 10 milliEquivalent(s) IV Intermittent every 1 hour  sodium chloride 0.9%. 1000 milliLiter(s) IV Continuous <Continuous>                    Physical Therapy Rec:   Home  [ x ]   Home w/ PT  [  ]  Rehab  [  ]  Discussed with Cardiothoracic Team at AM rounds.

## 2018-12-05 NOTE — DIETITIAN INITIAL EVALUATION ADULT. - NS AS NUTRI INTERV ED CONTENT
Heart Healthy nutrition therapy discussed with patient including; low sodium foods, foods to avoid, increased consumption of lean meats, whole grains, fruit and vegetables. Provided written handout. Encouraged PO intake. Discussed small, frequent meals high in protein and energy. Discussed lean sources of protein and protein/energy dense snacks.

## 2018-12-05 NOTE — PROGRESS NOTE ADULT - ASSESSMENT
This is a 63 year old  male with history of hypertension, HLD, gout  and CAD s/p 3 stents about 10 years prior ( on Aspirin and Plavix); .   Presents to [facility] ED this am with c/c of chest pain.. Patient states he woke up around 6 am with the pain. Pain was non-radiating. Not associated with palpitations or SOB. Patient states he called 911 and EMS brought him to hospital. Patient states he was not given SL nitro. Pain subsided in about 20-30 minutes after taking " 2 baby aspirins".  Patient states he is compliant with his medications. He played soccer last week with no chest pain, SOB, or palpitations but has experienced CP a few times before, cardiologist Dr Levin, pt reports last NST was normal approx 2 years ago. in ED [facility] troponin negative but 2nd elevated at 0.181; ECG shows no ischemic changes.  Transferred to [facility] for cardiac cath with possible intervention.  Presently asymptomatic in no chest pain.     s/p cath 3 vessel disease  CVS consult w Dr Mckeon   12/3/18 and underwent CABGX4 with LIMA to LAD; RSVG to OM, Ramus,. and RCA   + hypotension, fluid resuscitation  Extubated d 0  Iv insulin weaned off   Transferred to sdu  12/5 chest tube d/c  Toprol 75  Transfer to floor

## 2018-12-06 LAB
ANION GAP SERPL CALC-SCNC: 12 MMOL/L — SIGNIFICANT CHANGE UP (ref 5–17)
BUN SERPL-MCNC: 25 MG/DL — HIGH (ref 7–23)
CALCIUM SERPL-MCNC: 9.1 MG/DL — SIGNIFICANT CHANGE UP (ref 8.4–10.5)
CHLORIDE SERPL-SCNC: 103 MMOL/L — SIGNIFICANT CHANGE UP (ref 96–108)
CO2 SERPL-SCNC: 26 MMOL/L — SIGNIFICANT CHANGE UP (ref 22–31)
CREAT SERPL-MCNC: 0.85 MG/DL — SIGNIFICANT CHANGE UP (ref 0.5–1.3)
GLUCOSE BLDC GLUCOMTR-MCNC: 100 MG/DL — HIGH (ref 70–99)
GLUCOSE BLDC GLUCOMTR-MCNC: 119 MG/DL — HIGH (ref 70–99)
GLUCOSE BLDC GLUCOMTR-MCNC: 125 MG/DL — HIGH (ref 70–99)
GLUCOSE BLDC GLUCOMTR-MCNC: 125 MG/DL — HIGH (ref 70–99)
GLUCOSE SERPL-MCNC: 113 MG/DL — HIGH (ref 70–99)
HCT VFR BLD CALC: 28.1 % — LOW (ref 39–50)
HGB BLD-MCNC: 10 G/DL — LOW (ref 13–17)
MCHC RBC-ENTMCNC: 32.8 PG — SIGNIFICANT CHANGE UP (ref 27–34)
MCHC RBC-ENTMCNC: 35.7 GM/DL — SIGNIFICANT CHANGE UP (ref 32–36)
MCV RBC AUTO: 91.9 FL — SIGNIFICANT CHANGE UP (ref 80–100)
PLATELET # BLD AUTO: 190 K/UL — SIGNIFICANT CHANGE UP (ref 150–400)
POTASSIUM SERPL-MCNC: 3.9 MMOL/L — SIGNIFICANT CHANGE UP (ref 3.5–5.3)
POTASSIUM SERPL-SCNC: 3.9 MMOL/L — SIGNIFICANT CHANGE UP (ref 3.5–5.3)
RBC # BLD: 3.06 M/UL — LOW (ref 4.2–5.8)
RBC # FLD: 11.6 % — SIGNIFICANT CHANGE UP (ref 10.3–14.5)
SODIUM SERPL-SCNC: 141 MMOL/L — SIGNIFICANT CHANGE UP (ref 135–145)
WBC # BLD: 10 K/UL — SIGNIFICANT CHANGE UP (ref 3.8–10.5)
WBC # FLD AUTO: 10 K/UL — SIGNIFICANT CHANGE UP (ref 3.8–10.5)

## 2018-12-06 PROCEDURE — 71045 X-RAY EXAM CHEST 1 VIEW: CPT | Mod: 26

## 2018-12-06 RX ORDER — METOPROLOL TARTRATE 50 MG
50 TABLET ORAL EVERY 6 HOURS
Qty: 0 | Refills: 0 | Status: DISCONTINUED | OUTPATIENT
Start: 2018-12-06 | End: 2018-12-07

## 2018-12-06 RX ORDER — POTASSIUM CHLORIDE 20 MEQ
20 PACKET (EA) ORAL ONCE
Qty: 0 | Refills: 0 | Status: COMPLETED | OUTPATIENT
Start: 2018-12-06 | End: 2018-12-06

## 2018-12-06 RX ADMIN — Medication 100 MILLIGRAM(S): at 13:57

## 2018-12-06 RX ADMIN — Medication 100 MILLIGRAM(S): at 05:52

## 2018-12-06 RX ADMIN — ATORVASTATIN CALCIUM 40 MILLIGRAM(S): 80 TABLET, FILM COATED ORAL at 21:12

## 2018-12-06 RX ADMIN — Medication 50 MILLIGRAM(S): at 11:14

## 2018-12-06 RX ADMIN — Medication 325 MILLIGRAM(S): at 11:13

## 2018-12-06 RX ADMIN — CLOPIDOGREL BISULFATE 75 MILLIGRAM(S): 75 TABLET, FILM COATED ORAL at 11:13

## 2018-12-06 RX ADMIN — Medication 20 MILLIEQUIVALENT(S): at 11:12

## 2018-12-06 RX ADMIN — PANTOPRAZOLE SODIUM 40 MILLIGRAM(S): 20 TABLET, DELAYED RELEASE ORAL at 05:52

## 2018-12-06 RX ADMIN — Medication 50 MILLIGRAM(S): at 05:52

## 2018-12-06 RX ADMIN — ENOXAPARIN SODIUM 40 MILLIGRAM(S): 100 INJECTION SUBCUTANEOUS at 11:13

## 2018-12-06 RX ADMIN — Medication 50 MILLIGRAM(S): at 18:48

## 2018-12-06 NOTE — PROGRESS NOTE ADULT - PROBLEM SELECTOR PLAN 1
Continue with  mg PO Daily.   continue with Plavix 75 mg PO daily   Increase Lopressor 50 mg PO every 6 hour.  Isolate PW   Continue with Statin   Increase activity as tolerated.   Encourage Chest PT / Pulmonary toileting and Incentive spirometry every 1 hour x 10 while awake.   Continue with PUD and DVT prophylaxis.   Shower on POD #5.   D/C plan home PT Friday / Saturday    Plan of care discussed with attending

## 2018-12-06 NOTE — PROGRESS NOTE ADULT - ASSESSMENT
63 year old  male with history of hypertension, HLD, gout  and CAD s/p 3 stents about 10 years prior ( on Aspirin and Plavix);   Presents to [facility] ED this am with c/c of chest pain.. Patient states he woke up around 6 am with the pain. Pain was non-radiating. Not associated with palpitations or SOB. Patient states he called 911 and EMS brought him to hospital. Patient states he was not given SL nitro. Pain subsided in about 20-30 minutes after taking " 2 baby aspirins".  Patient states he is compliant with his medications. He played soccer last week with no chest pain, SOB, or palpitations but has experienced CP a few times before, cardiologist Dr Levin, pt reports last NST was normal approx 2 years ago. in ED First troponin negative but 2nd elevated at 0.181; ECG shows no ischemic changes.  Transferred to [facility] for cardiac cath with possible intervention.  Presently asymptomatic in no chest pain. s/p cath 3 vessel disease. CVS consult w Dr Fishman  On 12/3/18 and underwent CABGX4 with LIMA to LAD; RSVG to OM, Ramus,. and RCA   Post op Course:   + hypotension, fluid resuscitation / Extubated d 0  Hyperglycemia requiring insulin gtt now weaned off   Transferred to sdu  12/5 chest tube d/c; started on Metoprolol 50 mg PO daily and Transfer to floor.   12/6 VVS; Brief PAT overnight. Lopressor increased 50 mg PO every 6 hours.   Disposition: Home PT Frid / Sat

## 2018-12-06 NOTE — PROGRESS NOTE ADULT - SUBJECTIVE AND OBJECTIVE BOX
VITAL SIGNS    Subjective: "I'm feeling ok." Denies CP, palpitation, SOB, LIMA, HA, dizziness, N/V/D, fever or chills.  No acute event noted overnight.     Telemetry:  NSR 60-90    Vital Signs Last 24 Hrs  T(C): 36.6 (18 @ 13:56), Max: 37 (18 @ 19:55)  T(F): 97.9 (18 @ 13:56), Max: 98.6 (18 @ 19:55)  HR: 79 (18 @ 13:56) (79 - 87)  BP: 105/55 (18 @ 13:56) (105/55 - 129/77)  RR: 18 (18 @ 13:56) (18 - 18)  SpO2: 95% (18 @ 13:56) (88% - 95%)            07:01  -   @ 07:00  --------------------------------------------------------  IN: 800 mL / OUT: 2685 mL / NET: -1885 mL     @ 07:01  -   @ 15:54  --------------------------------------------------------  IN: 520 mL / OUT: 800 mL / NET: -280 mL    Daily     Daily Weight in k.2 (06 Dec 2018 07:30)    CAPILLARY BLOOD GLUCOSE    POCT Blood Glucose.: 125 mg/dL (06 Dec 2018 12:21)  POCT Blood Glucose.: 100 mg/dL (06 Dec 2018 07:38)  POCT Blood Glucose.: 155 mg/dL (05 Dec 2018 21:28)  POCT Blood Glucose.: 155 mg/dL (05 Dec 2018 16:44)                  PHYSICAL EXAM    Neurology: alert and oriented x 3, nonfocal, no gross deficits    CV: (+) S1 and S2, No murmurs, rubs, gallops or clicks     Sternal Wound:  MSI -->CDI , sternum stable; PW x 2 --> EPM     Lungs: CTA B/L     Abdomen: soft, nontender, nondistended, positive bowel sounds, (+) Flatus; (+) BM     :  Voiding               Extremities:  B/L LE (-) edema; negative calf tenderness; (+) 2 DP palpable        aspirin enteric coated 325 milliGRAM(s) Oral daily  atorvastatin 40 milliGRAM(s) Oral at bedtime  clopidogrel Tablet 75 milliGRAM(s) Oral daily  dextrose 50% Injectable 50 milliLiter(s) IV Push every 15 minutes  dextrose 50% Injectable 25 milliLiter(s) IV Push every 15 minutes  docusate sodium 100 milliGRAM(s) Oral three times a day  enoxaparin Injectable 40 milliGRAM(s) SubCutaneous daily  insulin Infusion 2 Unit(s)/Hr IV Continuous <Continuous>  insulin lispro (HumaLOG) corrective regimen sliding scale   SubCutaneous three times a day before meals  insulin lispro (HumaLOG) corrective regimen sliding scale   SubCutaneous at bedtime  metoprolol tartrate 50 milliGRAM(s) Oral every 6 hours  oxyCODONE 5 mG/acetaminophen 325 mG 1 Tablet(s) Oral every 6 hours PRN  oxyCODONE 5 mG/acetaminophen 325 mG 2 Tablet(s) Oral every 6 hours PRN  pantoprazole  Tablet 40 milliGRAM(s) Oral before breakfast    Physical Therapy Rec:   Home  [  ]   Home w/ PT  [ X ]  Rehab  [  ]    Discussed with Cardiothoracic Team at AM rounds.

## 2018-12-07 ENCOUNTER — TRANSCRIPTION ENCOUNTER (OUTPATIENT)
Age: 63
End: 2018-12-07

## 2018-12-07 VITALS — WEIGHT: 195.55 LBS

## 2018-12-07 LAB
ANION GAP SERPL CALC-SCNC: 14 MMOL/L — SIGNIFICANT CHANGE UP (ref 5–17)
BUN SERPL-MCNC: 18 MG/DL — SIGNIFICANT CHANGE UP (ref 7–23)
CALCIUM SERPL-MCNC: 9.1 MG/DL — SIGNIFICANT CHANGE UP (ref 8.4–10.5)
CHLORIDE SERPL-SCNC: 100 MMOL/L — SIGNIFICANT CHANGE UP (ref 96–108)
CO2 SERPL-SCNC: 25 MMOL/L — SIGNIFICANT CHANGE UP (ref 22–31)
CREAT SERPL-MCNC: 0.79 MG/DL — SIGNIFICANT CHANGE UP (ref 0.5–1.3)
GLUCOSE BLDC GLUCOMTR-MCNC: 103 MG/DL — HIGH (ref 70–99)
GLUCOSE BLDC GLUCOMTR-MCNC: 108 MG/DL — HIGH (ref 70–99)
GLUCOSE SERPL-MCNC: 109 MG/DL — HIGH (ref 70–99)
HCT VFR BLD CALC: 28.9 % — LOW (ref 39–50)
HGB BLD-MCNC: 10.6 G/DL — LOW (ref 13–17)
MCHC RBC-ENTMCNC: 33.4 PG — SIGNIFICANT CHANGE UP (ref 27–34)
MCHC RBC-ENTMCNC: 36.7 GM/DL — HIGH (ref 32–36)
MCV RBC AUTO: 90.8 FL — SIGNIFICANT CHANGE UP (ref 80–100)
PLATELET # BLD AUTO: 216 K/UL — SIGNIFICANT CHANGE UP (ref 150–400)
POTASSIUM SERPL-MCNC: 4.1 MMOL/L — SIGNIFICANT CHANGE UP (ref 3.5–5.3)
POTASSIUM SERPL-SCNC: 4.1 MMOL/L — SIGNIFICANT CHANGE UP (ref 3.5–5.3)
RBC # BLD: 3.18 M/UL — LOW (ref 4.2–5.8)
RBC # FLD: 11.3 % — SIGNIFICANT CHANGE UP (ref 10.3–14.5)
SODIUM SERPL-SCNC: 139 MMOL/L — SIGNIFICANT CHANGE UP (ref 135–145)
WBC # BLD: 8.4 K/UL — SIGNIFICANT CHANGE UP (ref 3.8–10.5)
WBC # FLD AUTO: 8.4 K/UL — SIGNIFICANT CHANGE UP (ref 3.8–10.5)

## 2018-12-07 PROCEDURE — 94002 VENT MGMT INPAT INIT DAY: CPT

## 2018-12-07 PROCEDURE — P9041: CPT

## 2018-12-07 PROCEDURE — 87641 MR-STAPH DNA AMP PROBE: CPT

## 2018-12-07 PROCEDURE — 82435 ASSAY OF BLOOD CHLORIDE: CPT

## 2018-12-07 PROCEDURE — 71250 CT THORAX DX C-: CPT

## 2018-12-07 PROCEDURE — C1887: CPT

## 2018-12-07 PROCEDURE — C1889: CPT

## 2018-12-07 PROCEDURE — 82803 BLOOD GASES ANY COMBINATION: CPT

## 2018-12-07 PROCEDURE — 71045 X-RAY EXAM CHEST 1 VIEW: CPT

## 2018-12-07 PROCEDURE — 82553 CREATINE MB FRACTION: CPT

## 2018-12-07 PROCEDURE — 83605 ASSAY OF LACTIC ACID: CPT

## 2018-12-07 PROCEDURE — 86923 COMPATIBILITY TEST ELECTRIC: CPT

## 2018-12-07 PROCEDURE — 85730 THROMBOPLASTIN TIME PARTIAL: CPT

## 2018-12-07 PROCEDURE — 97161 PT EVAL LOW COMPLEX 20 MIN: CPT

## 2018-12-07 PROCEDURE — 31720 CLEARANCE OF AIRWAYS: CPT

## 2018-12-07 PROCEDURE — 82962 GLUCOSE BLOOD TEST: CPT

## 2018-12-07 PROCEDURE — 93458 L HRT ARTERY/VENTRICLE ANGIO: CPT

## 2018-12-07 PROCEDURE — 80053 COMPREHEN METABOLIC PANEL: CPT

## 2018-12-07 PROCEDURE — 84443 ASSAY THYROID STIM HORMONE: CPT

## 2018-12-07 PROCEDURE — 99152 MOD SED SAME PHYS/QHP 5/>YRS: CPT

## 2018-12-07 PROCEDURE — 85014 HEMATOCRIT: CPT

## 2018-12-07 PROCEDURE — C1769: CPT

## 2018-12-07 PROCEDURE — 83880 ASSAY OF NATRIURETIC PEPTIDE: CPT

## 2018-12-07 PROCEDURE — 82465 ASSAY BLD/SERUM CHOLESTEROL: CPT

## 2018-12-07 PROCEDURE — 80061 LIPID PANEL: CPT

## 2018-12-07 PROCEDURE — 84132 ASSAY OF SERUM POTASSIUM: CPT

## 2018-12-07 PROCEDURE — 84295 ASSAY OF SERUM SODIUM: CPT

## 2018-12-07 PROCEDURE — 86891 AUTOLOGOUS BLOOD OP SALVAGE: CPT

## 2018-12-07 PROCEDURE — P9045: CPT

## 2018-12-07 PROCEDURE — 85384 FIBRINOGEN ACTIVITY: CPT

## 2018-12-07 PROCEDURE — 83036 HEMOGLOBIN GLYCOSYLATED A1C: CPT

## 2018-12-07 PROCEDURE — 84550 ASSAY OF BLOOD/URIC ACID: CPT

## 2018-12-07 PROCEDURE — 85027 COMPLETE CBC AUTOMATED: CPT

## 2018-12-07 PROCEDURE — 82550 ASSAY OF CK (CPK): CPT

## 2018-12-07 PROCEDURE — 82330 ASSAY OF CALCIUM: CPT

## 2018-12-07 PROCEDURE — 83735 ASSAY OF MAGNESIUM: CPT

## 2018-12-07 PROCEDURE — 82947 ASSAY GLUCOSE BLOOD QUANT: CPT

## 2018-12-07 PROCEDURE — 80048 BASIC METABOLIC PNL TOTAL CA: CPT

## 2018-12-07 PROCEDURE — 84436 ASSAY OF TOTAL THYROXINE: CPT

## 2018-12-07 PROCEDURE — C1894: CPT

## 2018-12-07 PROCEDURE — 87640 STAPH A DNA AMP PROBE: CPT

## 2018-12-07 PROCEDURE — P9047: CPT

## 2018-12-07 PROCEDURE — 84480 ASSAY TRIIODOTHYRONINE (T3): CPT

## 2018-12-07 PROCEDURE — 81003 URINALYSIS AUTO W/O SCOPE: CPT

## 2018-12-07 PROCEDURE — 84484 ASSAY OF TROPONIN QUANT: CPT

## 2018-12-07 PROCEDURE — C8929: CPT

## 2018-12-07 PROCEDURE — C1751: CPT

## 2018-12-07 PROCEDURE — 86850 RBC ANTIBODY SCREEN: CPT

## 2018-12-07 PROCEDURE — 99239 HOSP IP/OBS DSCHRG MGMT >30: CPT | Mod: 24

## 2018-12-07 PROCEDURE — P9016: CPT

## 2018-12-07 PROCEDURE — 85610 PROTHROMBIN TIME: CPT

## 2018-12-07 PROCEDURE — 86901 BLOOD TYPING SEROLOGIC RH(D): CPT

## 2018-12-07 PROCEDURE — 84100 ASSAY OF PHOSPHORUS: CPT

## 2018-12-07 PROCEDURE — 85576 BLOOD PLATELET AGGREGATION: CPT

## 2018-12-07 PROCEDURE — 93880 EXTRACRANIAL BILAT STUDY: CPT

## 2018-12-07 PROCEDURE — 93005 ELECTROCARDIOGRAM TRACING: CPT

## 2018-12-07 PROCEDURE — 86900 BLOOD TYPING SEROLOGIC ABO: CPT

## 2018-12-07 RX ORDER — METOPROLOL TARTRATE 50 MG
1 TABLET ORAL
Qty: 0 | Refills: 0 | COMMUNITY

## 2018-12-07 RX ORDER — METOPROLOL TARTRATE 50 MG
1 TABLET ORAL
Qty: 60 | Refills: 0 | OUTPATIENT
Start: 2018-12-07 | End: 2019-01-05

## 2018-12-07 RX ORDER — VALSARTAN 80 MG/1
1 TABLET ORAL
Qty: 0 | Refills: 0 | COMMUNITY

## 2018-12-07 RX ORDER — AMLODIPINE BESYLATE 2.5 MG/1
1 TABLET ORAL
Qty: 0 | Refills: 0 | COMMUNITY

## 2018-12-07 RX ORDER — ATORVASTATIN CALCIUM 80 MG/1
1 TABLET, FILM COATED ORAL
Qty: 0 | Refills: 0 | COMMUNITY

## 2018-12-07 RX ORDER — ASPIRIN/CALCIUM CARB/MAGNESIUM 324 MG
1 TABLET ORAL
Qty: 30 | Refills: 0 | OUTPATIENT
Start: 2018-12-07 | End: 2019-01-05

## 2018-12-07 RX ORDER — ASPIRIN/CALCIUM CARB/MAGNESIUM 324 MG
1 TABLET ORAL
Qty: 0 | Refills: 0 | COMMUNITY

## 2018-12-07 RX ORDER — CLOPIDOGREL BISULFATE 75 MG/1
1 TABLET, FILM COATED ORAL
Qty: 30 | Refills: 0 | OUTPATIENT
Start: 2018-12-07 | End: 2019-01-05

## 2018-12-07 RX ORDER — PANTOPRAZOLE SODIUM 20 MG/1
1 TABLET, DELAYED RELEASE ORAL
Qty: 30 | Refills: 0 | OUTPATIENT
Start: 2018-12-07 | End: 2019-01-05

## 2018-12-07 RX ORDER — ATORVASTATIN CALCIUM 80 MG/1
1 TABLET, FILM COATED ORAL
Qty: 30 | Refills: 0 | OUTPATIENT
Start: 2018-12-07 | End: 2019-01-05

## 2018-12-07 RX ORDER — DOCUSATE SODIUM 100 MG
1 CAPSULE ORAL
Qty: 90 | Refills: 0 | OUTPATIENT
Start: 2018-12-07 | End: 2019-01-05

## 2018-12-07 RX ORDER — CLOPIDOGREL BISULFATE 75 MG/1
1 TABLET, FILM COATED ORAL
Qty: 0 | Refills: 0 | COMMUNITY

## 2018-12-07 RX ADMIN — Medication 50 MILLIGRAM(S): at 05:49

## 2018-12-07 RX ADMIN — Medication 325 MILLIGRAM(S): at 12:04

## 2018-12-07 RX ADMIN — ENOXAPARIN SODIUM 40 MILLIGRAM(S): 100 INJECTION SUBCUTANEOUS at 12:05

## 2018-12-07 RX ADMIN — Medication 50 MILLIGRAM(S): at 12:04

## 2018-12-07 RX ADMIN — PANTOPRAZOLE SODIUM 40 MILLIGRAM(S): 20 TABLET, DELAYED RELEASE ORAL at 05:49

## 2018-12-07 RX ADMIN — CLOPIDOGREL BISULFATE 75 MILLIGRAM(S): 75 TABLET, FILM COATED ORAL at 12:04

## 2018-12-07 RX ADMIN — Medication 50 MILLIGRAM(S): at 00:11

## 2018-12-07 NOTE — DISCHARGE NOTE ADULT - ADDITIONAL INSTRUCTIONS
1. Follow up with Dr. Fishman on Thursday 12/13/18 at 1:45pm for your post op follow up appointment, please call the [facility] office to confirm your appointment at [phone].  2. Please schedule an appointment with your PCP in 1-2 week, call the office to schedule an appointment.   3. Please schedule an appointment with your Cardiologist in 1-2 weeks, please call the office to schedule an appointment.

## 2018-12-07 NOTE — DISCHARGE NOTE ADULT - MEDICATION SUMMARY - MEDICATIONS TO TAKE
I will START or STAY ON the medications listed below when I get home from the hospital:    aspirin 325 mg oral delayed release tablet  -- 1 tab(s) by mouth once a day  -- Indication: For Blood thinner for Graft patentcy     oxyCODONE-acetaminophen 5 mg-325 mg oral tablet  -- 2 tab(s) by mouth every 6 hours, As needed, Severe Pain (7 - 10) MDD:6 tabs  -- Indication: For As needed for pain     atorvastatin 40 mg oral tablet  -- 1 tab(s) by mouth once a day (at bedtime)  -- Indication: For Cholesterol     clopidogrel 75 mg oral tablet  -- 1 tab(s) by mouth once a day  -- Indication: For Blood thinner for h/o stents     Lopressor 100 mg oral tablet  -- 1 tab(s) by mouth 2 times a day   -- It is very important that you take or use this exactly as directed.  Do not skip doses or discontinue unless directed by your doctor.  May cause drowsiness.  Alcohol may intensify this effect.  Use care when operating dangerous machinery.  Some non-prescription drugs may aggravate your condition.  Read all labels carefully.  If a warning appears, check with your doctor before taking.  Take with food or milk.  This drug may impair the ability to drive or operate machinery.  Use care until you become familiar with its effects.    -- Indication: For Blood pressure and heart rate control     docusate sodium 100 mg oral capsule  -- 1 cap(s) by mouth 3 times a day, As Needed -for constipation   -- Indication: For Stool softner as needed for Constipation    pantoprazole 40 mg oral delayed release tablet  -- 1 tab(s) by mouth once a day (before a meal)  -- Indication: For Acid Reflux

## 2018-12-07 NOTE — DISCHARGE NOTE ADULT - PATIENT PORTAL LINK FT
You can access the [facility] Patient Portal, offered by [facility], by registering with the following website: [email or web address]

## 2018-12-07 NOTE — DISCHARGE NOTE ADULT - NS AS ACTIVITY OBS
Do not drive or operate machinery/Walking-Outdoors allowed/Stairs allowed/Sex allowed/No Heavy lifting/straining/Do not make important decisions/Showering allowed/Walking-Indoors allowed

## 2018-12-07 NOTE — DISCHARGE NOTE ADULT - HOSPITAL COURSE
63 year old  male with history of hypertension, HLD, gout  and CAD s/p 3 stents about 10 years prior ( on Aspirin and Plavix);   Presents to [facility] ED this am with c/c of chest pain.. Patient states he woke up around 6 am with the pain. Pain was non-radiating. Not associated with palpitations or SOB. Patient states he called 911 and EMS brought him to hospital. Patient states he was not given SL nitro. Pain subsided in about 20-30 minutes after taking " 2 baby aspirins".  Patient states he is compliant with his medications. He played soccer last week with no chest pain, SOB, or palpitations but has experienced CP a few times before, cardiologist Dr Levin, pt reports last NST was normal approx 2 years ago. in ED First troponin negative but 2nd elevated at 0.181; ECG shows no ischemic changes.  Transferred to [facility] for cardiac cath with possible intervention.  Presently asymptomatic in no chest pain. s/p cath 3 vessel disease. CVS consult w Dr Fishman  On 12/3/18 and underwent CABGX4 with LIMA to LAD; RSVG to OM, Ramus,. and RCA   Post op Course:   Pressor support required 2/2 hypotension -->fluid resuscitation. Extubated d 0  Hyperglycemia requiring insulin gtt now weaned off   Transferred to sdu  12/5 chest tube d/c; started on Metoprolol 50 mg PO daily and Transfer to floor.   12/6 VVS; Brief PAT overnight. Lopressor increased 50 mg PO every 6 hours.   12/7 VVS; PW D/C this am; D/C home this afternoon.

## 2018-12-07 NOTE — DISCHARGE NOTE ADULT - CARE PROVIDER_API CALL
Tao Fishman), Surgery; Thoracic Surgery  [address] [place] [address]  Phone: [phone]  Fax: [phone]    Raheel Levin), Cardiology; Internal Medicine; Nuclear Cardiology  [facility]  [address] [place] [address]  Phone: [phone]  Fax: [phone]

## 2018-12-07 NOTE — DISCHARGE NOTE ADULT - CARE PROVIDERS DIRECT ADDRESSES
[email or web address]

## 2018-12-07 NOTE — DISCHARGE NOTE ADULT - MEDICATION SUMMARY - MEDICATIONS TO STOP TAKING
I will STOP taking the medications listed below when I get home from the hospital:    Toprol- mg oral tablet, extended release  -- 1 tab(s) by mouth once a day  \home    Diovan 320 mg oral tablet  -- 1 tab(s) by mouth once a day  home    Norvasc 5 mg oral tablet  -- 1 tab(s) by mouth once a day  home

## 2018-12-10 ENCOUNTER — APPOINTMENT (OUTPATIENT)
Age: 63
End: 2018-12-10
Payer: COMMERCIAL

## 2018-12-10 VITALS
RESPIRATION RATE: 16 BRPM | HEART RATE: 72 BPM | WEIGHT: 189 LBS | DIASTOLIC BLOOD PRESSURE: 76 MMHG | SYSTOLIC BLOOD PRESSURE: 116 MMHG | OXYGEN SATURATION: 98 %

## 2018-12-10 DIAGNOSIS — Z95.1 PRESENCE OF AORTOCORONARY BYPASS GRAFT: ICD-10-CM

## 2018-12-10 PROCEDURE — 99024 POSTOP FOLLOW-UP VISIT: CPT

## 2018-12-10 RX ORDER — ATORVASTATIN CALCIUM 40 MG/1
40 TABLET, FILM COATED ORAL
Refills: 0 | Status: ACTIVE | COMMUNITY
Start: 2018-12-10

## 2018-12-10 RX ORDER — CLOPIDOGREL 75 MG/1
75 TABLET, FILM COATED ORAL DAILY
Refills: 0 | Status: ACTIVE | COMMUNITY
Start: 2018-12-10

## 2018-12-10 RX ORDER — ASPIRIN 325 MG/1
325 TABLET ORAL DAILY
Refills: 0 | Status: ACTIVE | COMMUNITY
Start: 2018-12-10

## 2018-12-10 RX ORDER — METOPROLOL TARTRATE 100 MG/1
100 TABLET, FILM COATED ORAL
Refills: 0 | Status: ACTIVE | COMMUNITY
Start: 2018-12-10

## 2018-12-13 ENCOUNTER — APPOINTMENT (OUTPATIENT)
Dept: CARDIOTHORACIC SURGERY | Facility: CLINIC | Age: 63
End: 2018-12-13

## 2018-12-13 VITALS
BODY MASS INDEX: 27.99 KG/M2 | OXYGEN SATURATION: 97 % | HEIGHT: 69 IN | HEART RATE: 68 BPM | WEIGHT: 189 LBS | RESPIRATION RATE: 13 BRPM | SYSTOLIC BLOOD PRESSURE: 131 MMHG | TEMPERATURE: 97.7 F | DIASTOLIC BLOOD PRESSURE: 81 MMHG

## 2019-11-03 NOTE — PROGRESS NOTE ADULT - PROBLEM SELECTOR PLAN 4
Mildly to Moderately Impaired: difficulty hearing in some environments or speaker may need to increase volume or speak distinctly replete

## 2020-10-15 NOTE — CONSULT NOTE ADULT - ATTENDING COMMENTS
[facility]     Subjective:       Chief Complaint:   Chief Complaint   Patient presents with    Back Pain       History of Present Illness (HPI)  51-year-old gentleman presents to clinic in order to establish care.  Patient is a new patient to clinic.  In addition, patient reports right-sided sciatica with low back pain.  Recent workup demonstrated degenerative disc disease lumbar as well as lumbar spondylosis without evidence of spondylolysis or spondylolisthesis.  Patient reports recent exacerbation of right-sided sciatic with evidence of a piriformis syndrome.  Patient reports improvement in symptoms within the past few weeks but still endorses pain.  Denies any lumbar red flags signs/symptoms.  Denies any cauda equina signs/symptoms.  No other acute complaints to address today. Denies N/V/F/C/CP/SOB/HA.      Current Outpatient Medications   Medication Sig    meloxicam (MOBIC) 15 MG tablet Take 1 tablet (15 mg total) by mouth daily as needed for Pain.    tiZANidine (ZANAFLEX) 4 MG tablet Take 1 tablet (4 mg total) by mouth daily as needed.     No current facility-administered medications for this visit.        No past medical history on file.    No past surgical history on file.    No family history on file.    Social History     Socioeconomic History    Marital status:      Spouse name: Not on file    Number of children: Not on file    Years of education: Not on file    Highest education level: Not on file   Occupational History    Not on file   Social Needs    Financial resource strain: Not on file    Food insecurity     Worry: Not on file     Inability: Not on file    Transportation needs     Medical: Not on file     Non-medical: Not on file   Tobacco Use    Smoking status: Not on file   Substance and Sexual Activity    Alcohol use: Not on file    Drug use: Not on file    Sexual activity: Not on file   Lifestyle    Physical activity     Days per week: Not on file     Minutes per  "session: Not on file    Stress: Not on file   Relationships    Social connections     Talks on phone: Not on file     Gets together: Not on file     Attends Muslim service: Not on file     Active member of club or organization: Not on file     Attends meetings of clubs or organizations: Not on file     Relationship status: Not on file   Other Topics Concern    Not on file   Social History Narrative    Not on file       The following portions of the patient's history were reviewed and updated as appropriate: allergies, current medications, past family history, past medical history, past social history, past surgical history and problem list.    Previous medical records reviewed and summarized above in HPI.    Review of Systems     10 point review of systems was conducted and only the pertinent positives and pertinent negatives are noted above in the HPI section.         Objective:     Vitals:    10/15/20 0923   Weight: 97 kg (213 lb 13.5 oz)   Height: 5' 9" (1.753 m)       Constitutional: AAOx3  NAD.   Head: Normocephalic and atraumatic.   Eyes: EOMI grossly and PERRLA.  Neck:  Neck supple.   Cardiovascular: Normal rate and intact distal pulses.   Pulmonary/Chest: No acute respiratory distress. CTAB.  Abdominal: Soft. Bowel sounds are present. No rebound/guarding. No peritoneal signs.   Musculoskeletal:  Exhibits no edema. -RLE SLR. TTP along the lumbosacral paraspinal muscles. TTP over the greater troch bursa or ischial bursa. NTTP over the SI joints. Decreased strength in the hip flexors, hip extensors, and plantar flexion which is likely secondary to pain. Gross sensation to the LE dermatomes grossly intact to L3-S1. +DP/PT 2+. Tight Hamstrings/iliopsoas on exam. No red flags concerning for cauda equina on exam.  evidence of piriformis syndrome.  Neurological: No focal neurological deficit appreciated from baseline  Skin: Skin is warm and dry.  The patient is not diaphoretic.   Psychiatric:  Mood and affect " are congruent.  Nursing note and vitals reviewed.       Assessment/Plan:          Problem List Items Addressed This Visit        Neuro    Piriformis syndrome of right side    Current Assessment & Plan     Continue conservative medical management  We discussed at length the warning signs/symptoms in order for the patient to return to clinic and/or present to the ED if unable to reach the clinic within a timely manner including but not limited to: increased pain, change in gait, change in sensation around the rectum or perineum, bowel or bladder issues/incontinent, urinary retention, and fever. Via teach back mechanism, the patient voiced understanding of the aforementioned recommendations/instructions.            Relevant Orders    CBC auto differential    Comprehensive Metabolic Panel    Hemoglobin A1C    Lipid Panel    Methylmalonic Acid, Serum    TSH    Vitamin D    Lumbar spondylosis    Current Assessment & Plan     Continue conservative medical management  We discussed at length the warning signs/symptoms in order for the patient to return to clinic and/or present to the ED if unable to reach the clinic within a timely manner including but not limited to: increased pain, change in gait, change in sensation around the rectum or perineum, bowel or bladder issues/incontinent, urinary retention, and fever. Via teach back mechanism, the patient voiced understanding of the aforementioned recommendations/instructions.            Relevant Medications    meloxicam (MOBIC) 15 MG tablet    Other Relevant Orders    CBC auto differential    Comprehensive Metabolic Panel    Hemoglobin A1C    Lipid Panel    Methylmalonic Acid, Serum    TSH    Vitamin D       Cardiac/Vascular    Elevated blood-pressure reading without diagnosis of hypertension    Current Assessment & Plan     Encouraged patient to maintain a BP log and bring it next scheduled clinic visit  Denies any end organ damage signs/symptoms including but not limited to:  HA, decreased urine output, CP, visual changes, abdominal pain w/ rebound/guarding or SOB.            Relevant Orders    CBC auto differential    Comprehensive Metabolic Panel    Hemoglobin A1C    Lipid Panel    Methylmalonic Acid, Serum    TSH    Vitamin D      Other Visit Diagnoses     Muscle spasm    -  Primary    Relevant Medications    tiZANidine (ZANAFLEX) 4 MG tablet    Other Relevant Orders    CBC auto differential    Comprehensive Metabolic Panel    Hemoglobin A1C    Lipid Panel    Methylmalonic Acid, Serum    TSH    Vitamin D    Sciatica of right side        Relevant Orders    CBC auto differential    Comprehensive Metabolic Panel    Hemoglobin A1C    Lipid Panel    Methylmalonic Acid, Serum    TSH    Vitamin D    Need for hepatitis C screening test        Relevant Orders    Hepatitis C Antibody    Encounter for screening for HIV        Relevant Orders    HIV 1/2 Ag/Ab (4th Gen)            Follow up in about 4 weeks (around 11/12/2020) for Lab results.    Additional workup planned: see labs ordered above.      In today's visit, monitoring for drug toxicity was accomplished.     Patient counseled about the importance of healthy dietary habits as well as routine physical activity and exercise for better health outcomes.     The patient's diagnosis and medications were discussed. Proper use of medications was dicussed. I also discussed the importance of close follow up to discuss labs, change or modify her medications if needed, monitor side effects, and further evaluation of medical problems. I also discussed the importance of cancer screening.     Case discussed with attending physician, Dr. Kaplan     Disclaimer: This note has been generated using voice-recognition software. There may be typographical errors that have been missed during proof-reading.    Omid Nguyen Jr., D.O.  [facility],  [place], -[address]  [facility]       Patient scheduled for nuclear stress test tomorrow. Aware of the scheduled test. Also explained to patient and wife that if chest pain returns later on today when he is home to call 911 immediately and return to hospital for further evaluation.

## 2020-11-16 NOTE — PROGRESS NOTE ADULT - PROBLEM SELECTOR PROBLEM 6
Elbow pain, left Rhomboid Transposition Flap Text: The defect edges were debeveled with a #15 scalpel blade.  Given the location of the defect and the proximity to free margins a rhomboid transposition flap was deemed most appropriate.  Using a sterile surgical marker, an appropriate rhomboid flap was drawn incorporating the defect.    The area thus outlined was incised deep to adipose tissue with a #15 scalpel blade.  The skin margins were undermined to an appropriate distance in all directions utilizing iris scissors.

## 2022-02-19 NOTE — ED PROVIDER NOTE - TIMING
sudden onset sudden onset/intermittent/resolved Admission Reconciliation is Completed  Discharge Reconciliation is Completed

## 2022-04-05 NOTE — CONSULT NOTE ADULT - PROBLEM SELECTOR RECOMMENDATION 2
Elevated but patient states he hasn't taken his home medications yet. Recommend resuming home medications Composite Graft Text: The defect edges were debeveled with a #15 scalpel blade.  Given the location of the defect, shape of the defect, the proximity to free margins and the fact the defect was full thickness a composite graft was deemed most appropriate.  The defect was outline and then transferred to the donor site.  A full thickness graft was then excised from the donor site. The graft was then placed in the primary defect, oriented appropriately and then sutured into place.  The secondary defect was then repaired using a primary closure.

## 2022-04-08 NOTE — ED ADULT NURSE NOTE - RESPIRATORY WDL
[Fatigue] : fatigue [Negative] : Endocrine Breathing spontaneous and unlabored. Breath sounds clear and equal bilaterally with regular rhythm.

## 2022-10-05 NOTE — H&P CARDIOLOGY - RESPIRATORY
10/05/22                            Frankie Scruggs  [address] [facility] [id number]    To Whom It May Concern:    This is to certify Frankie Scruggs was evaluated with Camryn Menjivar OD on 10/05/22 at 10:00AM.          Camryn Menjivar OD  [facility] [address]   [address] [facility] [address]  Phone: [phone]  Fax: [phone]    
Breath Sounds equal & clear to percussion & auscultation, no accessory muscle use

## 2023-10-16 ENCOUNTER — TRANSCRIPTION ENCOUNTER (OUTPATIENT)
Age: 68
End: 2023-10-16

## 2023-12-05 NOTE — DISCHARGE NOTE ADULT - OTHER SIGNIFICANT FINDINGS
asthma General: WN/WD NAD  Neurology: A&Ox3, nonfocal, ROBERTS x 4  Head:  Normocephalic, atraumatic  ENT:  Mucosa moist, no ulcerations  Neck:  Supple, no sinuses or palpable masses  Lymphatic:  No palpable cervical, supraclavicular, axillary or inguinal adenopathy  Respiratory: CTA B/L  CV: RRR, S1S2, no murmur  Abdominal: Soft, NT, ND no palpable mass  MSK: No edema, + peripheral pulses, FROM all 4 extremity; Left SVG site C/D/I; LLE upper inner thigh grossly ecchymotic.    Incisions: intact, no erythema or drainage

## 2024-06-08 NOTE — PRE-OP CHECKLIST - AS BP NONINV METHOD
Patient resting in bed. Respirations easy and unlabored. No distress noted. Call light within reach. Pt states he is still unable to void.   electronic

## 2025-05-27 NOTE — ED ADULT NURSE NOTE - CCCP TRG CHIEF CMPLNT
Pt reports CP, palpitations, and SOB x3 days. Pt was sent from the heart failure clinic. Pt denies fever and chills.    chest pain